# Patient Record
Sex: FEMALE | Race: OTHER | HISPANIC OR LATINO | Employment: UNEMPLOYED | URBAN - METROPOLITAN AREA
[De-identification: names, ages, dates, MRNs, and addresses within clinical notes are randomized per-mention and may not be internally consistent; named-entity substitution may affect disease eponyms.]

---

## 2022-05-25 ENCOUNTER — APPOINTMENT (EMERGENCY)
Dept: CT IMAGING | Facility: HOSPITAL | Age: 72
DRG: 372 | End: 2022-05-25
Payer: COMMERCIAL

## 2022-05-25 ENCOUNTER — HOSPITAL ENCOUNTER (INPATIENT)
Facility: HOSPITAL | Age: 72
LOS: 2 days | Discharge: HOME | DRG: 372 | End: 2022-05-28
Attending: EMERGENCY MEDICINE | Admitting: INTERNAL MEDICINE
Payer: COMMERCIAL

## 2022-05-25 DIAGNOSIS — K52.9 COLITIS: Primary | ICD-10-CM

## 2022-05-25 PROBLEM — F32.A DEPRESSION: Status: ACTIVE | Noted: 2022-05-25

## 2022-05-25 PROBLEM — E03.9 HYPOTHYROIDISM: Status: ACTIVE | Noted: 2022-05-25

## 2022-05-25 PROBLEM — D69.9 BLEEDING DISORDER (HCC): Status: ACTIVE | Noted: 2022-05-25

## 2022-05-25 LAB
ANION GAP SERPL CALCULATED.3IONS-SCNC: 7 MMOL/L (ref 4–13)
APTT PPP: 39 SECONDS (ref 23–37)
BASOPHILS # BLD AUTO: 0.02 THOUSANDS/ΜL (ref 0–0.1)
BASOPHILS NFR BLD AUTO: 0 % (ref 0–1)
BUN SERPL-MCNC: 9 MG/DL (ref 5–25)
CALCIUM SERPL-MCNC: 8.9 MG/DL (ref 8.3–10.1)
CHLORIDE SERPL-SCNC: 98 MMOL/L (ref 100–108)
CO2 SERPL-SCNC: 27 MMOL/L (ref 21–32)
CREAT SERPL-MCNC: 0.63 MG/DL (ref 0.6–1.3)
CRP SERPL QL: 89.2 MG/L
EOSINOPHIL # BLD AUTO: 0.22 THOUSAND/ΜL (ref 0–0.61)
EOSINOPHIL NFR BLD AUTO: 2 % (ref 0–6)
ERYTHROCYTE [DISTWIDTH] IN BLOOD BY AUTOMATED COUNT: 13.6 % (ref 11.6–15.1)
GFR SERPL CREATININE-BSD FRML MDRD: 89 ML/MIN/1.73SQ M
GLUCOSE SERPL-MCNC: 100 MG/DL (ref 65–140)
HCT VFR BLD AUTO: 40.3 % (ref 34.8–46.1)
HGB BLD-MCNC: 13.3 G/DL (ref 11.5–15.4)
IMM GRANULOCYTES # BLD AUTO: 0.03 THOUSAND/UL (ref 0–0.2)
IMM GRANULOCYTES NFR BLD AUTO: 0 % (ref 0–2)
INR PPP: 1.03 (ref 0.84–1.19)
LYMPHOCYTES # BLD AUTO: 1.46 THOUSANDS/ΜL (ref 0.6–4.47)
LYMPHOCYTES NFR BLD AUTO: 15 % (ref 14–44)
MCH RBC QN AUTO: 31.1 PG (ref 26.8–34.3)
MCHC RBC AUTO-ENTMCNC: 33 G/DL (ref 31.4–37.4)
MCV RBC AUTO: 94 FL (ref 82–98)
MONOCYTES # BLD AUTO: 1.31 THOUSAND/ΜL (ref 0.17–1.22)
MONOCYTES NFR BLD AUTO: 14 % (ref 4–12)
NEUTROPHILS # BLD AUTO: 6.57 THOUSANDS/ΜL (ref 1.85–7.62)
NEUTS SEG NFR BLD AUTO: 69 % (ref 43–75)
NRBC BLD AUTO-RTO: 0 /100 WBCS
PLATELET # BLD AUTO: 281 THOUSANDS/UL (ref 149–390)
PMV BLD AUTO: 8.8 FL (ref 8.9–12.7)
POTASSIUM SERPL-SCNC: 4.8 MMOL/L (ref 3.5–5.3)
PROTHROMBIN TIME: 13.3 SECONDS (ref 11.6–14.5)
RBC # BLD AUTO: 4.27 MILLION/UL (ref 3.81–5.12)
SODIUM SERPL-SCNC: 132 MMOL/L (ref 136–145)
WBC # BLD AUTO: 9.61 THOUSAND/UL (ref 4.31–10.16)

## 2022-05-25 PROCEDURE — 87493 C DIFF AMPLIFIED PROBE: CPT | Performed by: EMERGENCY MEDICINE

## 2022-05-25 PROCEDURE — G1004 CDSM NDSC: HCPCS

## 2022-05-25 PROCEDURE — 99220 PR INITIAL OBSERVATION CARE/DAY 70 MINUTES: CPT | Performed by: INTERNAL MEDICINE

## 2022-05-25 PROCEDURE — 85025 COMPLETE CBC W/AUTO DIFF WBC: CPT | Performed by: EMERGENCY MEDICINE

## 2022-05-25 PROCEDURE — 99244 OFF/OP CNSLTJ NEW/EST MOD 40: CPT | Performed by: PHYSICIAN ASSISTANT

## 2022-05-25 PROCEDURE — 85730 THROMBOPLASTIN TIME PARTIAL: CPT | Performed by: INTERNAL MEDICINE

## 2022-05-25 PROCEDURE — 99285 EMERGENCY DEPT VISIT HI MDM: CPT | Performed by: EMERGENCY MEDICINE

## 2022-05-25 PROCEDURE — 36415 COLL VENOUS BLD VENIPUNCTURE: CPT | Performed by: EMERGENCY MEDICINE

## 2022-05-25 PROCEDURE — 80048 BASIC METABOLIC PNL TOTAL CA: CPT | Performed by: EMERGENCY MEDICINE

## 2022-05-25 PROCEDURE — 86140 C-REACTIVE PROTEIN: CPT | Performed by: INTERNAL MEDICINE

## 2022-05-25 PROCEDURE — 87505 NFCT AGENT DETECTION GI: CPT | Performed by: EMERGENCY MEDICINE

## 2022-05-25 PROCEDURE — 85610 PROTHROMBIN TIME: CPT | Performed by: INTERNAL MEDICINE

## 2022-05-25 PROCEDURE — 74176 CT ABD & PELVIS W/O CONTRAST: CPT

## 2022-05-25 PROCEDURE — 99285 EMERGENCY DEPT VISIT HI MDM: CPT

## 2022-05-25 RX ORDER — ESCITALOPRAM OXALATE 10 MG/1
10 TABLET ORAL DAILY
Status: DISCONTINUED | OUTPATIENT
Start: 2022-05-25 | End: 2022-05-28 | Stop reason: HOSPADM

## 2022-05-25 RX ORDER — ENOXAPARIN SODIUM 100 MG/ML
40 INJECTION SUBCUTANEOUS DAILY
Status: DISCONTINUED | OUTPATIENT
Start: 2022-05-26 | End: 2022-05-26

## 2022-05-25 RX ORDER — LOPERAMIDE HYDROCHLORIDE 2 MG/1
2 CAPSULE ORAL 4 TIMES DAILY PRN
Status: DISCONTINUED | OUTPATIENT
Start: 2022-05-25 | End: 2022-05-28 | Stop reason: HOSPADM

## 2022-05-25 RX ORDER — LEVOTHYROXINE SODIUM 0.05 MG/1
50 TABLET ORAL DAILY
COMMUNITY

## 2022-05-25 RX ORDER — ONDANSETRON 2 MG/ML
4 INJECTION INTRAMUSCULAR; INTRAVENOUS EVERY 6 HOURS PRN
Status: DISCONTINUED | OUTPATIENT
Start: 2022-05-25 | End: 2022-05-28 | Stop reason: HOSPADM

## 2022-05-25 RX ORDER — ACETAMINOPHEN 325 MG/1
650 TABLET ORAL EVERY 6 HOURS PRN
Status: DISCONTINUED | OUTPATIENT
Start: 2022-05-25 | End: 2022-05-28 | Stop reason: HOSPADM

## 2022-05-25 RX ORDER — SODIUM CHLORIDE 9 MG/ML
100 INJECTION, SOLUTION INTRAVENOUS CONTINUOUS
Status: DISPENSED | OUTPATIENT
Start: 2022-05-25 | End: 2022-05-26

## 2022-05-25 RX ORDER — LEVOTHYROXINE SODIUM 0.05 MG/1
50 TABLET ORAL
Status: DISCONTINUED | OUTPATIENT
Start: 2022-05-26 | End: 2022-05-28 | Stop reason: HOSPADM

## 2022-05-25 RX ORDER — MAGNESIUM HYDROXIDE/ALUMINUM HYDROXICE/SIMETHICONE 120; 1200; 1200 MG/30ML; MG/30ML; MG/30ML
30 SUSPENSION ORAL EVERY 6 HOURS PRN
Status: DISCONTINUED | OUTPATIENT
Start: 2022-05-25 | End: 2022-05-28 | Stop reason: HOSPADM

## 2022-05-25 RX ORDER — SACCHAROMYCES BOULARDII 250 MG
250 CAPSULE ORAL 2 TIMES DAILY
Status: DISCONTINUED | OUTPATIENT
Start: 2022-05-25 | End: 2022-05-28 | Stop reason: HOSPADM

## 2022-05-25 RX ORDER — ESCITALOPRAM OXALATE 10 MG/1
10 TABLET ORAL DAILY
COMMUNITY

## 2022-05-25 RX ADMIN — SODIUM CHLORIDE 100 ML/HR: 0.9 INJECTION, SOLUTION INTRAVENOUS at 17:34

## 2022-05-25 RX ADMIN — Medication 125 MG: at 17:35

## 2022-05-25 RX ADMIN — Medication 250 MG: at 17:35

## 2022-05-25 NOTE — H&P
[address] [facility]  H&P- [facility] 1950, 66 y o  female MRN: [id number]  Unit/Bed#: [facility] Encounter: [id number]  Primary Care Provider: No primary care provider on file  Date and time admitted to hospital: 5/25/2022 11:51 AM    Assessment and Plan  Bleeding disorder [facility])  Assessment & Plan  Ill-defined bleeding disorder, patient's daughter reports history bleeding after thyroidectomy in the past  Monitor with DVT prophylaxis  Reports not von Willebrand's    Hypothyroidism  Assessment & Plan  Continue Synthroid    Depression  Assessment & Plan  Continue Lexapro    * Colitis  Assessment & Plan  Patient with 2 week symptoms of diarrhea  Initially felt to be in the setting of recent seafood consumption although though family members with any similar intake  Need course of clindamycin and Flagyl  Remains high risk for C diff infection  Start empiric oral vancomycin  Await fecal studies  GI consultation  Last colonoscopy 10 years in [facility]  Check inflammatory markers  No known history of inflammatory bowel disease  Monitor stool output        Code Status:  Full code    VTE Prophylaxis: Enoxaparin (Lovenox)  / sequential compression device     POLST: There is no POLST form on file for this patient (pre-hospital)  Discussion with family:  Daughter at bedside provided translation services    Anticipated Length of Stay:  Patient will be admitted on an Observation basis with an anticipated length of stay of  less than 2 midnights  Justification for Hospital Stay: Colitis     Total Time for Visit, including Counseling / Coordination of Care: 45 minutes  Greater than 50% of this total time spent on direct patient counseling and coordination of care      Chief Complaint:     Chief Complaint   Patient presents with    Diarrhea     Pt c/o diarrhea for a month has been to urgent care twice has been on medication but only helped a little bit , this morning there was some blood and mucus in her liquid stool, stool is yellow and green  C/o of lower abdominal discomfort with nausea,   ate calamari a month ago and since then, hx diverticulitis       History of Present Illness:    Jonah Bryson is a 66 y o  female who presents with nausea vomiting and diarrhea, she reports that she is visiting from [facility]  She rides in April  Approximately 2 weeks ago they did have calcium RA, she was after that she did not feel well  Family members ate the same thing with no similar symptoms  The patient denies any abdominal pain or history of previous inflammatory bowel disease  Today she did have a few episodes of blood in her bowel movements of presented to the emergency room for acute evaluation  She had recently completed a course of clindamycin and Flagyl at urgent care  She has no other recent travel or trip history, and plan is to return back to home on August   She has no local PCP follow-up    Review of Systems:    A complete and comprehensive 14 point organ system review was performed and all other systems are negative other than stated above in the HPI    Past Medical and Surgical History:     Past Medical History:   Diagnosis Date    Disease of thyroid gland     Diverticulitis     Diverticulitis        Past Surgical History:   Procedure Laterality Date    THYROIDECTOMY      THYROIDECTOMY, PARTIAL         Meds/Allergies:    Prior to Admission medications    Medication Sig Start Date End Date Taking? Authorizing Provider   escitalopram (LEXAPRO) 10 mg tablet Take 10 mg by mouth daily   Yes Historical Provider, MD   levothyroxine 50 mcg tablet Take 50 mcg by mouth daily   Yes Historical Provider, MD     I have reviewed home medications with patient personally  Allergies:    Allergies   Allergen Reactions    Secnidazole Facial Swelling    Tinidazole Swelling    Penicillins Rash       Social History:     Marital Status: Single   Occupation:  Unknown    Substance Use History:   Social History Substance and Sexual Activity   Alcohol Use Never     Social History     Tobacco Use   Smoking Status Never Smoker   Smokeless Tobacco Never Used     Social History     Substance and Sexual Activity   Drug Use Never       Family History:    History reviewed  No pertinent family history  Physical Exam:     Vitals:   Blood Pressure: 119/65 (05/25/22 1444)  Pulse: 79 (05/25/22 1444)  Temperature: 98 5 °F (36 9 °C) (05/25/22 1134)  Temp Source: Oral (05/25/22 1134)  Respirations: 18 (05/25/22 1444)  Height: 5' 1" (154 9 cm) (05/25/22 1134)  Weight - Scale: 47 7 kg (105 lb 2 6 oz) (05/25/22 1134)  SpO2: 98 % (05/25/22 1444)      General: well appearing, no acute distress  HEENT: atraumatic, PERRLA, moist mucosa, normal pharynx, normal tonsils and adenoids, normal tongue, no fluid in sinuses  Neck: Trachea midline, no carotid bruit, no masses  Respiratory: normal chest wall expansion, CTA B, no r/r/w, no rubs  Cardiovascular: RRR, no m/r/g, Normal S1 and S2  Abdomen: Soft, non-tender, non-distended, normal bowel sounds in all quadrants, no hepatosplenomegaly, no tympany  Rectal: deferred  Musculoskeletal: normal ROM in upper and lower extremities  Integumentary: warm, dry, and pink, with no rash, purpura, or petechia  Heme/Lymph: no lymphadenopathy, no bruises  Neurological: Cranial Nerves II-XII grossly intact  Psychiatric: cooperative with normal mood, affect, and cognition    Additional Data:     Lab Results: I have personally reviewed pertinent reports        Results from last 7 days   Lab Units 05/25/22  1212   WBC Thousand/uL 9 61   HEMOGLOBIN g/dL 13 3   HEMATOCRIT % 40 3   PLATELETS Thousands/uL 281   NEUTROS PCT % 69   LYMPHS PCT % 15   MONOS PCT % 14*   EOS PCT % 2     Results from last 7 days   Lab Units 05/25/22  1212   SODIUM mmol/L 132*   POTASSIUM mmol/L 4 8   CHLORIDE mmol/L 98*   CO2 mmol/L 27   BUN mg/dL 9   CREATININE mg/dL 0 63   ANION GAP mmol/L 7   CALCIUM mg/dL 8 9   GLUCOSE RANDOM mg/dL 100 Imaging: I have personally reviewed pertinent reports  CT abdomen pelvis wo contrast   Final Result by Moiz Xiao MD (05/25 8137)   Diffuse colonic and rectal wall thickening consistent with uncomplicated colitis/proctitis  Workstation performed: [id number]             EKG, Pathology, and Other Studies Reviewed on Admission:   · Reviewed CT abdomen and pelvis without contrast which shows uncomplicated colitis/proctitis    Allscripts / Epic Records Reviewed: Yes     ** Please Note: This note was completed in part utilizing M-Modal Fluency Direct Software  Grammatical errors, random word insertions, spelling mistakes, and incomplete sentences may be an occasional consequence of this system secondary to software limitations, ambient noise, and hardware issues  If you have any questions or concerns about the content, text, or information contained within the body of this dictation, please contact the provider for clarification  **

## 2022-05-25 NOTE — PLAN OF CARE
Problem: GASTROINTESTINAL - ADULT  Goal: Minimal or absence of nausea and/or vomiting  Description: INTERVENTIONS:  - Administer IV fluids if ordered to ensure adequate hydration  - Maintain NPO status until nausea and vomiting are resolved  - Nasogastric tube if ordered  - Administer ordered antiemetic medications as needed  - Provide nonpharmacologic comfort measures as appropriate  - Advance diet as tolerated, if ordered  - Consider nutrition services referral to assist patient with adequate nutrition and appropriate food choices  Outcome: Progressing  Goal: Maintains or returns to baseline bowel function  Description: INTERVENTIONS:  - Assess bowel function  - Encourage oral fluids to ensure adequate hydration  - Administer IV fluids if ordered to ensure adequate hydration  - Administer ordered medications as needed  - Encourage mobilization and activity  - Consider nutritional services referral to assist patient with adequate nutrition and appropriate food choices  Outcome: Progressing  Goal: Maintains adequate nutritional intake  Description: INTERVENTIONS:  - Monitor percentage of each meal consumed  - Identify factors contributing to decreased intake, treat as appropriate  - Assist with meals as needed  - Monitor I&O, weight, and lab values if indicated  - Obtain nutrition services referral as needed  Outcome: Progressing     Problem: PAIN - ADULT  Goal: Verbalizes/displays adequate comfort level or baseline comfort level  Description: Interventions:  - Encourage patient to monitor pain and request assistance  - Assess pain using appropriate pain scale  - Administer analgesics based on type and severity of pain and evaluate response  - Implement non-pharmacological measures as appropriate and evaluate response  - Consider cultural and social influences on pain and pain management  - Notify physician/advanced practitioner if interventions unsuccessful or patient reports new pain  Outcome: Progressing Problem: INFECTION - ADULT  Goal: Absence or prevention of progression during hospitalization  Description: INTERVENTIONS:  - Assess and monitor for signs and symptoms of infection  - Monitor lab/diagnostic results  - Monitor all insertion sites, i e  indwelling lines, tubes, and drains  - Monitor endotracheal if appropriate and nasal secretions for changes in amount and color  - [place] appropriate cooling/warming therapies per order  - Administer medications as ordered  - Instruct and encourage patient and family to use good hand hygiene technique  - Identify and instruct in appropriate isolation precautions for identified infection/condition  Outcome: Progressing     Problem: SAFETY ADULT  Goal: Patient will remain free of falls  Description: INTERVENTIONS:  - Educate patient/family on patient safety including physical limitations  - Instruct patient to call for assistance with activity   - Consult OT/PT to assist with strengthening/mobility   - Keep Call bell within reach  - Keep bed low and locked with side rails adjusted as appropriate  - Keep care items and personal belongings within reach  - Initiate and maintain comfort rounds  - Make Fall Risk Sign visible to staff  - Offer Toileting every 2 Hours, in advance of need  - Apply yellow socks and bracelet for high fall risk patients  - Consider moving patient to room near nurses station  Outcome: Progressing  Goal: Maintain or return to baseline ADL function  Description: INTERVENTIONS:  -  Assess patient's ability to carry out ADLs; assess patient's baseline for ADL function and identify physical deficits which impact ability to perform ADLs (bathing, care of mouth/teeth, toileting, grooming, dressing, etc )  - Assess/evaluate cause of self-care deficits   - Assess range of motion  - Assess patient's mobility; develop plan if impaired  - Assess patient's need for assistive devices and provide as appropriate  - Encourage maximum independence but intervene and supervise when necessary  - Involve family in performance of ADLs  - Assess for home care needs following discharge   - Consider OT consult to assist with ADL evaluation and planning for discharge  - Provide patient education as appropriate  Outcome: Progressing  Goal: Maintains/Returns to pre admission functional level  Description: INTERVENTIONS:  - Perform BMAT or MOVE assessment daily    - Set and communicate daily mobility goal to care team and patient/family/caregiver  - Collaborate with rehabilitation services on mobility goals if consulted  - Perform Range of Motion 3 times a day  - Reposition patient every 2 hours  - Dangle patient 3 times a day  - Stand patient 3 times a day  - Ambulate patient 3 times a day  - Out of bed to chair 3 times a day   - Out of bed for meals 3 times a day  - Out of bed for toileting  - Record patient progress and toleration of activity level   Outcome: Progressing     Problem: DISCHARGE PLANNING  Goal: Discharge to home or other facility with appropriate resources  Description: INTERVENTIONS:  - Identify barriers to discharge w/patient and caregiver  - Arrange for needed discharge resources and transportation as appropriate  - Identify discharge learning needs (meds, wound care, etc )  - Arrange for interpretive services to assist at discharge as needed  - Refer to Case Management Department for coordinating discharge planning if the patient needs post-hospital services based on physician/advanced practitioner order or complex needs related to functional status, cognitive ability, or social support system  Outcome: Progressing     Problem: Knowledge Deficit  Goal: Patient/family/caregiver demonstrates understanding of disease process, treatment plan, medications, and discharge instructions  Description: Complete learning assessment and assess knowledge base    Interventions:  - Provide teaching at level of understanding  - Provide teaching via preferred learning methods  Outcome: Progressing

## 2022-05-25 NOTE — ED NOTES
Patient attempted to provide sample for c diff however contaminated with urine  RN relayed to provider  Patient's daughter also asked regarding eating/drinking and provider indicated that was acceptable       Bita Love, RN  05/25/22 4556

## 2022-05-25 NOTE — CONSULTS
Consultation - [facility]  Jose Bradshaw 66 y o  female MRN: [id number]  Unit/Bed#: E5 -01 Encounter: [id number]        Inpatient consult to gastroenterology  Consult performed by: Angela Bellamy PA-C  Consult ordered by: Amalia Adams DO        ASSESSMENT and PLAN:      69-year-old [place] female with history of non-specific bleeding disorder, depression, hypothyroidism admitted with diarrhea for 1-2 months  1) Chronic diarrhea  2) Abdominal cramping  3) Abnormal CT scan, colon    She reports diarrhea for 1-2 months sometimes mixed with blood and mucous, abdominal cramping, gas  Her symptoms began after eating seafood, and she was treated with course of metronidazole and ciprofloxacin without improvement  Blood work mostly unremarkable except for mild hyponatremia  Hemoglobin, WBC count, creatinine all normal  She is afebrile and hemodynamically stable  CT abdomen pelvis wo contrast shows diffuse colonic and rectal wall thickening consistent with uncomplicated colitis/proctitis  Differential includes infectious colitis vs inflammatory bowel disease vs less likely ischemic colitis or malignancy     -await results of stool enteric panel and C diff toxin  -await CRP and fecal calprotectin   -if infectious stool studies negative, we will plan for colonoscopy on Friday  -she can have regular diet tonight then clear liquids tomorrow in anticipation of colonoscopy  -continues supportive care with IVF  -okay to continue empiric oral vancomycin for now    Patient was seen and examined by Dr Ana Fleming  All alejandra medical decisions were made by Dr Ana Fleming  Thank you for allowing us to participate in the care of this present patient  We will follow-up with you closely  Reason for Consult / Principal Problem: diarrhea, colitis    HPI: 69-year-old [place] female with history of non-specific bleeding disorder, depression, hypothyroidism admitted with diarrhea x 1 5 months   Mongolian  [id number] was used to communicate  She normally lives in [facility] but came to visit her daugher in the [address] 2 months ago  She reports symptoms began 1 5 months ago after she ate some squid  She sought medical care and was treated with metronidazole and ciprofloxacin  Her symptoms persisted  She reports upwards of 5-6 liquid BMs daily mixed with mucous and blood at times  She is very gassy  She also reports abdominal cramping  She thinks she may have lost some weight  She denies nausea, vomiting, fever, chills  She reports her mother was diagnosed with colon inflammation  She denies family history of colon cancer  She had a colonoccopy about 10 years ago in [facility]  REVIEW OF SYSTEMS:    CONSTITUTIONAL: Denies any fever, chills  Good appetite, and no recent weight loss  HEENT: No earache or tinnitus  Denies hearing loss or visual disturbances  CARDIOVASCULAR: No chest pain or palpitations  RESPIRATORY: Denies any cough, hemoptysis, shortness of breath or dyspnea on exertion  GASTROINTESTINAL: As noted in the History of Present Illness  GENITOURINARY: No problems with urination  Denies any hematuria or dysuria  NEUROLOGIC: No dizziness or vertigo, denies headaches  MUSCULOSKELETAL: Denies any muscle or joint pain  SKIN: Denies skin rashes or itching  ENDOCRINE: Denies excessive thirst  Denies intolerance to heat or cold  PSYCHOSOCIAL: Denies depression or anxiety  Denies any recent memory loss         Historical Information   Past Medical History:   Diagnosis Date    Disease of thyroid gland     Diverticulitis     Diverticulitis      Past Surgical History:   Procedure Laterality Date    THYROIDECTOMY      THYROIDECTOMY, PARTIAL       Social History   Social History     Substance and Sexual Activity   Alcohol Use Never     Social History     Substance and Sexual Activity   Drug Use Never     Social History     Tobacco Use   Smoking Status Never Smoker   Smokeless Tobacco Never Used History reviewed  No pertinent family history  Meds/Allergies     Medications Prior to Admission   Medication    escitalopram (LEXAPRO) 10 mg tablet    levothyroxine 50 mcg tablet     Current Facility-Administered Medications   Medication Dose Route Frequency    acetaminophen (TYLENOL) tablet 650 mg  650 mg Oral Q6H PRN    aluminum-magnesium hydroxide-simethicone (MYLANTA) oral suspension 30 mL  30 mL Oral Q6H PRN    [START ON 5/26/2022] enoxaparin (LOVENOX) subcutaneous injection 40 mg  40 mg Subcutaneous Daily    escitalopram (LEXAPRO) tablet 10 mg  10 mg Oral Daily    [START ON 5/26/2022] levothyroxine tablet 50 mcg  50 mcg Oral Early Morning    ondansetron (ZOFRAN) injection 4 mg  4 mg Intravenous Q6H PRN    sodium chloride 0 9 % infusion  100 mL/hr Intravenous Continuous    vancomycin (VANCOCIN) oral solution 125 mg  125 mg Oral Q6H DONTAE       Allergies   Allergen Reactions    Secnidazole Facial Swelling    Tinidazole Swelling    Penicillins Rash           Objective     Blood pressure 100/66, pulse 79, temperature 98 3 °F (36 8 °C), resp  rate 18, height 5' 1" (1 549 m), weight 47 7 kg (105 lb 2 6 oz), SpO2 98 %      No intake or output data in the 24 hours ending 05/25/22 1620      PHYSICAL EXAM:      General Appearance:   Alert, cooperative, no distress, appears stated age    HEENT:   Normocephalic, atraumatic, anicteric      Neck:  Supple, symmetrical, trachea midline, no adenopathy   Lungs:   Clear to auscultation bilaterally   Heart[de-identified]   S1 and S2 normal; regular rate and rhythm   Abdomen:   Soft, non-tender, non-distended; normal bowel sounds   Genitalia:   Deferred    Rectal:   Deferred    Extremities:  No cyanosis, clubbing or edema    Pulses:  2+ and symmetric all extremities    Skin:  Skin color, texture, turgor normal, no rashes or lesions    Lymph nodes:  No palpable cervical lymphadenopathy        Lab Results:   Results from last 7 days   Lab Units 05/25/22  1212   WBC Thousand/uL 9  61   HEMOGLOBIN g/dL 13 3   HEMATOCRIT % 40 3   PLATELETS Thousands/uL 281   NEUTROS PCT % 69   LYMPHS PCT % 15   MONOS PCT % 14*   EOS PCT % 2     Results from last 7 days   Lab Units 05/25/22  1212   POTASSIUM mmol/L 4 8   CHLORIDE mmol/L 98*   CO2 mmol/L 27   BUN mg/dL 9   CREATININE mg/dL 0 63   CALCIUM mg/dL 8 9     Results from last 7 days   Lab Units 05/25/22  1215   INR  1 03           Imaging Studies: I have personally reviewed pertinent imaging studies  CT abdomen pelvis wo contrast    Result Date: 5/25/2022  Impression: Diffuse colonic and rectal wall thickening consistent with uncomplicated colitis/proctitis   Workstation performed: [id number]

## 2022-05-25 NOTE — ASSESSMENT & PLAN NOTE
Patient with 2 week symptoms of diarrhea  Initially felt to be in the setting of recent seafood consumption although though family members with any similar intake  Need course of clindamycin and Flagyl    Remains high risk for C diff infection  Start empiric oral vancomycin  Await fecal studies  GI consultation  Last colonoscopy 10 years in [facility]  Check inflammatory markers  No known history of inflammatory bowel disease  Monitor stool output

## 2022-05-25 NOTE — ED NOTES
Patient has fecal specimen that was left in room  RN labeled and tech will be calling the floor to notify prior to sending through tube station       Shaheed Pedroza RN  05/25/22 4330

## 2022-05-25 NOTE — ED PROVIDER NOTES
Emergency Department Note- Mayela Carter 66 y o  female MRN: [id number]    Unit/Bed#: ED 29 Encounter: [id number]        History of Present Illness     Patient is a 76-year-old female, predominantly Bulgarian speaking, accompanied by her daughter, indicates she would prefer to have the daughter as a  rather than using a  service  Patient normally lives in [place], came to the [facility] 2 months ago to stay with her daughter temporarily  One month ago the patient began having multiple episodes of nonbloody, non mucousy but watery diarrhea  Had some lower abdominal cramping with the diarrhea, prior to coming to [facility], there was no unusual travel history, no camping or backpacking or drinking from streams or antibiotics  There has been no subsequent travel history, camping, backpacking, drinking from streams, or antibiotics  This started today after the patient's daughter's  prepared calamari, but everyone that ate  that and no one else develops symptoms  She was seen in urgent care on May 7th for these symptoms, prescribe 7 days of ciprofloxacin and metronidazole, had slight decrease in the frequency of diarrhea but did not go completely away  Patient was seen 2 days ago at urgent care, had outpatient laboratory and stool samples ordered but those have not been performed yet  Patient presents today because yesterday she had significant increase in the frequency of the diarrhea, 7 episodes during the daytime, 6 overnight  During the overnight episodes she noticed some bright red blood in her stool  She has some lower abdominal cramping previously, with the diarrhea  She has no pain right now  She has no fever, no chills, no cough, shortness of breath, no myalgias or arthralgias, no nausea or vomiting      REVIEW OF SYSTEMS     Constitutional:  No recent weight  gains or losses   Eyes:  No visual changes   ENT:  No tinnitus or hearing changes   Cardiac: No chest pain or palpitations   Respiratory:  No cough or shortness of breath   Abdominal:  As per HPI   Urinary: No dysuria or hematuria   Hematologic: No easy bruising or bleeding   Skin: No rash   Musculoskeletal: No aches or pains   Neurologic: No weakness or sensory changes   Psychiatric: No mood changes      Historical Information   Past Medical History:   Diagnosis Date    Disease of thyroid gland     Diverticulitis     Diverticulitis        Past surgical history:  Partial thyroidectomy, hysterectomy  Social History   Social History     Substance and Sexual Activity   Alcohol Use Never     Social History     Substance and Sexual Activity   Drug Use Never     Social History     Tobacco Use   Smoking Status Never Smoker   Smokeless Tobacco Never Used     Family History: History reviewed  No pertinent family history  MEDICATIONS:  Levothyroxine, citalopram    ALLERGIES:  Allergies   Allergen Reactions    Secnidazole Facial Swelling    Tinidazole Swelling    Penicillins Rash       Vitals:    05/25/22 1134   BP: 111/74   TempSrc: Oral   Pulse: 84   Resp: 18   Patient Position - Orthostatic VS: Sitting   Temp: 98 5 °F (36 9 °C)       PHYSICAL EXAM    General:  Patient is well-appearing  Head:  Atraumatic  Eyes:  Conjunctiva pink  ENT:  Mucous membranes are moist  Neck:  Supple  Cardiac:  S1-S2, without murmurs  Lungs:  Clear to auscultation bilaterally  Abdomen:  Soft, nontender, normal bowel sounds, no CVA tenderness, no tympany, no rigidity, no guarding  Extremities:  Normal range of motion  Neurologic:  Awake, fluent speech, normal comprehension  AAOx3     Skin:  Pink warm and dry  Psychiatric:  Alert, pleasant, cooperative            Labs Reviewed   CBC AND DIFFERENTIAL - Abnormal       Result Value Ref Range Status    WBC 9 61  4 31 - 10 16 Thousand/uL Final    RBC 4 27  3 81 - 5 12 Million/uL Final    Hemoglobin 13 3  11 5 - 15 4 g/dL Final    Hematocrit 40 3  34 8 - 46 1 % Final    MCV 94  82 - 98 fL Final MCH 31 1  26 8 - 34 3 pg Final    MCHC 33 0  31 4 - 37 4 g/dL Final    RDW 13 6  11 6 - 15 1 % Final    MPV 8 8 (*) 8 9 - 12 7 fL Final    Platelets 919  193 - 390 Thousands/uL Final    nRBC 0  /100 WBCs Final    Neutrophils Relative 69  43 - 75 % Final    Immat GRANS % 0  0 - 2 % Final    Lymphocytes Relative 15  14 - 44 % Final    Monocytes Relative 14 (*) 4 - 12 % Final    Eosinophils Relative 2  0 - 6 % Final    Basophils Relative 0  0 - 1 % Final    Neutrophils Absolute 6 57  1 85 - 7 62 Thousands/µL Final    Immature Grans Absolute 0 03  0 00 - 0 20 Thousand/uL Final    Lymphocytes Absolute 1 46  0 60 - 4 47 Thousands/µL Final    Monocytes Absolute 1 31 (*) 0 17 - 1 22 Thousand/µL Final    Eosinophils Absolute 0 22  0 00 - 0 61 Thousand/µL Final    Basophils Absolute 0 02  0 00 - 0 10 Thousands/µL Final   BASIC METABOLIC PANEL - Abnormal    Sodium 132 (*) 136 - 145 mmol/L Final    Potassium 4 8  3 5 - 5 3 mmol/L Final    Chloride 98 (*) 100 - 108 mmol/L Final    CO2 27  21 - 32 mmol/L Final    ANION GAP 7  4 - 13 mmol/L Final    BUN 9  5 - 25 mg/dL Final    Creatinine 0 63  0 60 - 1 30 mg/dL Final    Comment: Standardized to IDMS reference method    Glucose 100  65 - 140 mg/dL Final    Comment: If the patient is fasting, the ADA then defines impaired fasting glucose as > 100 mg/dL and diabetes as > or equal to 123 mg/dL  Specimen collection should occur prior to Sulfasalazine administration due to the potential for falsely depressed results  Specimen collection should occur prior to Sulfapyridine administration due to the potential for falsely elevated results      Calcium 8 9  8 3 - 10 1 mg/dL Final    eGFR 89  ml/min/1 73sq m Final    Narrative:     [place] guidelines for Chronic Kidney Disease (CKD):     Stage 1 with normal or high GFR (GFR > 90 mL/min/1 73 square meters)    Stage 2 Mild CKD (GFR = 60-89 mL/min/1 73 square meters)    Stage 3A Moderate CKD (GFR = 45-59 mL/min/1 73 square meters)    Stage 3B Moderate CKD (GFR = 30-44 mL/min/1 73 square meters)    Stage 4 Severe CKD (GFR = 15-29 mL/min/1 73 square meters)    Stage 5 End Stage CKD (GFR <15 mL/min/1 73 square meters)  Note: GFR calculation is accurate only with a steady state creatinine   STOOL ENTERIC BACTERIAL PANEL BY PCR   CLOSTRIDIUM DIFFICILE TOXIN BY PCR WITH EIA       Medications - No data to display    CT abdomen pelvis wo contrast   Final Result   Diffuse colonic and rectal wall thickening consistent with uncomplicated colitis/proctitis  Workstation performed: [id number]             ED Course as of 05/25/22 1410   Wed May 25, 2022   1345 On reassessment, no change from the above findings       Assessment/Plan     ED Medical Decision Making:    Due to a nationwide contrast shortage, IV contrast was not available to be used in this patient's CT per [facility] protocol  Patient overall well appearing, no signs of peritonitis on exam, given increased frequency of diarrhea as well as now developing bloody diarrhea, will admit for further workup and management  Infectious pathology less likely given patient's history, as well as previous treatment with antibiotics  Time reflects when diagnosis was documented in both MDM as applicable and the Disposition within this note     Time User Action Codes Description Comment    5/25/2022  1:48 PM [facility] Add [K52 9] Colitis       ED Disposition     ED Disposition   Admit    Condition   Stable    Date/Time   Wed May 25, 2022  2:04 PM    Comment   Case was discussed with Dr Benny Godoy and the patient's admission status was agreed to be Admission Status: observation status to the service of Dr Benny Godoy              Follow-up Information    None         New Prescriptions    No medications on file            Saida Umana DO  05/25/22 1410

## 2022-05-25 NOTE — ASSESSMENT & PLAN NOTE
Ill-defined bleeding disorder, patient's daughter reports history bleeding after thyroidectomy in the past  Monitor with DVT prophylaxis  Reports not [facility]

## 2022-05-25 NOTE — ED NOTES
Explained to pt that a stool sample is needed and how to use the hat        Aime Flores  05/25/22 4701

## 2022-05-26 ENCOUNTER — ANESTHESIA EVENT (INPATIENT)
Dept: GASTROENTEROLOGY | Facility: HOSPITAL | Age: 72
DRG: 372 | End: 2022-05-26
Payer: COMMERCIAL

## 2022-05-26 LAB
ANION GAP SERPL CALCULATED.3IONS-SCNC: 6 MMOL/L (ref 4–13)
BASOPHILS # BLD AUTO: 0.02 THOUSANDS/ΜL (ref 0–0.1)
BASOPHILS NFR BLD AUTO: 0 % (ref 0–1)
BUN SERPL-MCNC: 4 MG/DL (ref 5–25)
C DIFF TOX A+B STL QL IA: NEGATIVE
C DIFF TOX B TCDB STL QL NAA+PROBE: POSITIVE
CALCIUM SERPL-MCNC: 8.8 MG/DL (ref 8.3–10.1)
CAMPYLOBACTER DNA SPEC NAA+PROBE: NORMAL
CHLORIDE SERPL-SCNC: 104 MMOL/L (ref 100–108)
CO2 SERPL-SCNC: 25 MMOL/L (ref 21–32)
CREAT SERPL-MCNC: 0.67 MG/DL (ref 0.6–1.3)
EOSINOPHIL # BLD AUTO: 0.24 THOUSAND/ΜL (ref 0–0.61)
EOSINOPHIL NFR BLD AUTO: 4 % (ref 0–6)
ERYTHROCYTE [DISTWIDTH] IN BLOOD BY AUTOMATED COUNT: 13.5 % (ref 11.6–15.1)
GFR SERPL CREATININE-BSD FRML MDRD: 88 ML/MIN/1.73SQ M
GLUCOSE SERPL-MCNC: 99 MG/DL (ref 65–140)
HCT VFR BLD AUTO: 36.3 % (ref 34.8–46.1)
HGB BLD-MCNC: 11.7 G/DL (ref 11.5–15.4)
IMM GRANULOCYTES # BLD AUTO: 0.04 THOUSAND/UL (ref 0–0.2)
IMM GRANULOCYTES NFR BLD AUTO: 1 % (ref 0–2)
LYMPHOCYTES # BLD AUTO: 1.33 THOUSANDS/ΜL (ref 0.6–4.47)
LYMPHOCYTES NFR BLD AUTO: 24 % (ref 14–44)
MCH RBC QN AUTO: 29.8 PG (ref 26.8–34.3)
MCHC RBC AUTO-ENTMCNC: 32.2 G/DL (ref 31.4–37.4)
MCV RBC AUTO: 92 FL (ref 82–98)
MONOCYTES # BLD AUTO: 0.71 THOUSAND/ΜL (ref 0.17–1.22)
MONOCYTES NFR BLD AUTO: 13 % (ref 4–12)
NEUTROPHILS # BLD AUTO: 3.16 THOUSANDS/ΜL (ref 1.85–7.62)
NEUTS SEG NFR BLD AUTO: 58 % (ref 43–75)
NRBC BLD AUTO-RTO: 0 /100 WBCS
PLATELET # BLD AUTO: 278 THOUSANDS/UL (ref 149–390)
PMV BLD AUTO: 9.2 FL (ref 8.9–12.7)
POTASSIUM SERPL-SCNC: 4.7 MMOL/L (ref 3.5–5.3)
RBC # BLD AUTO: 3.93 MILLION/UL (ref 3.81–5.12)
SALMONELLA DNA SPEC QL NAA+PROBE: NORMAL
SHIGA TOXIN STX GENE SPEC NAA+PROBE: NORMAL
SHIGELLA DNA SPEC QL NAA+PROBE: NORMAL
SODIUM SERPL-SCNC: 135 MMOL/L (ref 136–145)
WBC # BLD AUTO: 5.5 THOUSAND/UL (ref 4.31–10.16)

## 2022-05-26 PROCEDURE — 80048 BASIC METABOLIC PNL TOTAL CA: CPT | Performed by: INTERNAL MEDICINE

## 2022-05-26 PROCEDURE — 99232 SBSQ HOSP IP/OBS MODERATE 35: CPT | Performed by: INTERNAL MEDICINE

## 2022-05-26 PROCEDURE — 85025 COMPLETE CBC W/AUTO DIFF WBC: CPT | Performed by: INTERNAL MEDICINE

## 2022-05-26 PROCEDURE — 99232 SBSQ HOSP IP/OBS MODERATE 35: CPT | Performed by: PHYSICIAN ASSISTANT

## 2022-05-26 PROCEDURE — 83993 ASSAY FOR CALPROTECTIN FECAL: CPT | Performed by: INTERNAL MEDICINE

## 2022-05-26 RX ORDER — POLYETHYLENE GLYCOL 3350 17 G/17G
238 POWDER, FOR SOLUTION ORAL ONCE
Status: COMPLETED | OUTPATIENT
Start: 2022-05-26 | End: 2022-05-26

## 2022-05-26 RX ORDER — SODIUM CHLORIDE 9 MG/ML
100 INJECTION, SOLUTION INTRAVENOUS CONTINUOUS
Status: DISCONTINUED | OUTPATIENT
Start: 2022-05-26 | End: 2022-05-28

## 2022-05-26 RX ADMIN — Medication 125 MG: at 05:08

## 2022-05-26 RX ADMIN — Medication 125 MG: at 17:05

## 2022-05-26 RX ADMIN — POLYETHYLENE GLYCOL 3350 238 G: 17 POWDER, FOR SOLUTION ORAL at 17:06

## 2022-05-26 RX ADMIN — Medication 125 MG: at 00:00

## 2022-05-26 RX ADMIN — ENOXAPARIN SODIUM 40 MG: 40 INJECTION SUBCUTANEOUS at 08:56

## 2022-05-26 RX ADMIN — LEVOTHYROXINE SODIUM 50 MCG: 50 TABLET ORAL at 07:28

## 2022-05-26 RX ADMIN — ESCITALOPRAM OXALATE 10 MG: 10 TABLET ORAL at 08:56

## 2022-05-26 RX ADMIN — Medication 125 MG: at 11:51

## 2022-05-26 RX ADMIN — SODIUM CHLORIDE 100 ML/HR: 0.9 INJECTION, SOLUTION INTRAVENOUS at 03:20

## 2022-05-26 RX ADMIN — Medication 250 MG: at 17:05

## 2022-05-26 RX ADMIN — Medication 250 MG: at 08:56

## 2022-05-26 NOTE — ASSESSMENT & PLAN NOTE
· Ill-defined bleeding disorder, patient's daughter reported history bleeding after thyroidectomy in the past  · Patient with blood in stools per family report   Discontinue Lovenox

## 2022-05-26 NOTE — PROGRESS NOTES
Progress Note - Arlin Lugo 66 y o  female MRN: [id number]    Unit/Bed#: E5 -01 Encounter: [id number]    Subjective:     Patient seen and examined bedside  Her daughter is present and helps provide history  She has been on vancomycin and currently her diarrhea is less frequent although still liquidy with mucus and tinge of blood  Objective:     Vitals: Blood pressure 99/64, pulse 70, temperature (!) 97 1 °F (36 2 °C), resp  rate 18, height 5' 1" (1 549 m), weight 49 kg (108 lb), SpO2 96 %  ,Body mass index is 20 41 kg/m²  No intake or output data in the 24 hours ending 05/26/22 1409    Physical Exam:     General Appearance: Alert, appears stated age and cooperative  Lungs: Clear to auscultation bilaterally, no rales or rhonchi, no labored breathing/accessory muscle use  Heart: Regular rate and rhythm, S1, S2 normal, no murmur, click, rub or gallop  Abdomen: Soft, non-tender, non-distended; bowel sounds normal; no masses or no organomegaly  Extremities: No cyanosis, edema    Invasive Devices  Report    Peripheral Intravenous Line  Duration           Peripheral IV 05/25/22 Right Antecubital 1 day                Lab Results:  Results from last 7 days   Lab Units 05/26/22  0452   WBC Thousand/uL 5 50   HEMOGLOBIN g/dL 11 7   HEMATOCRIT % 36 3   PLATELETS Thousands/uL 278   NEUTROS PCT % 58   LYMPHS PCT % 24   MONOS PCT % 13*   EOS PCT % 4     Results from last 7 days   Lab Units 05/26/22  0452   POTASSIUM mmol/L 4 7   CHLORIDE mmol/L 104   CO2 mmol/L 25   BUN mg/dL 4*   CREATININE mg/dL 0 67   CALCIUM mg/dL 8 8     Results from last 7 days   Lab Units 05/25/22  1215   INR  1 03           Imaging Studies: I have personally reviewed pertinent imaging studies  CT abdomen pelvis wo contrast    Result Date: 5/25/2022  Impression: Diffuse colonic and rectal wall thickening consistent with uncomplicated colitis/proctitis   Workstation performed: [id number]       Assessment and Plan:    53-year-old [place] female with history of non-specific bleeding disorder, depression, hypothyroidism admitted with diarrhea for 1-2 months     1) Chronic diarrhea  2) Abdominal cramping  3) Abnormal CT scan, colon     Her chronic diarrhea and abdominal cramping may be due to infectious colitis, IBD, microscopic colitis, celiac disease, IBS, other  Blood work grossly unremarkable  C diff toxin by PCR positive although toxin A+B negative which is more consistent with colonization without active infection  Stool enteric panel negative    CT abdomen pelvis wo contrast shows diffuse colonic and rectal wall thickening consistent with uncomplicated colitis/proctitis       -I had long discussion with patient's daughter at bedside  -we will continue oral vancomycin 125 mg every 6 hours for now  -we will plan for colonoscopy tomorrow to evaluate for pseudomembranes which could be consistent with active C diff infection, signs of inflammatory bowel disease, take random colon biopsies to rule out microscopic colitis  -continue clear liquid diet and give gentle MiraLax prep this evening  -NPO after midnight  -check celiac serologies for completeness

## 2022-05-26 NOTE — QUICK NOTE
Informed Willard Arceo  that patient's blood pressure has dipped down from 113/71 to 91/55  Pt has fluids running at 100cc/hr and had 1 loose BM  Will continue to monitor

## 2022-05-26 NOTE — UTILIZATION REVIEW
Initial Clinical Review    Admission: Date/Time/Statement:   Admission Orders (From admission, onward)     Ordered        05/25/22 1405  Place in Observation  Once                      05/26/22 1246  Inpatient Admission  Once        Transfer Service: Hospitalist       Question Answer Comment   Level of Care Med Surg    Estimated length of stay More than 2 Midnights    Certification I certify that inpatient services are medically necessary for this patient for a duration of greater than two midnights  See H&P and MD Progress Notes for additional information about the patient's course of treatment  05/26/22 1245   OBSERVATION  5/25  @  [address]  5/26  @  1245  The patient, admitted on an observation basis, will now require > 2 midnight hospital stay due to bloody, mucusy diarrhea      ED Arrival Information     Expected   -    Arrival   5/25/2022 10:53    Acuity   Urgent            Means of arrival   Walk-In    Escorted by   Self    Service   Hospitalist    Admission type   Urgent            Arrival complaint   diarrhea           Chief Complaint   Patient presents with    Diarrhea     Pt c/o diarrhea for a month has been to urgent care twice has been on medication but only helped a little bit , this morning there was some blood and mucus in her liquid stool, stool is yellow and green  C/o of lower abdominal discomfort with nausea,   ate calamari a month ago and since then, hx diverticulitis       Initial Presentation: 66 y o  female presents to ED from home with nausea, vomiting and diarrhea  Currently  Visiting from [facility], arrived in April  Had  A f ew episodes of blood  In BM's  The day of admission  Symptoms present for 1 month  Symptoms started after eating seafood  No  Other family members ill  Seen twice at Urgent Care and completed a  Course of  Po flagyl and clindamycin  No local  PCP  PMH  Is  Ill defined bleeding disorder, hypothyroidism and depression     Admit Observation with  Colitis and plan is   Monitor stool output, monitor labs, GI consult and wait stool studies  GI consult  ( 5/25)    This could be due to an infectious diarrhea, inflammatory bowel disease, microscopic colitis  We will check her stool studies including ova and parasites  If these are negative and her symptoms persist we will plan for colonoscopy on Friday  Since she is being empirically treated for Clostridium difficile colitis with oral vancomycin, I am okay with her taking Imodium as needed to try to slow down her bowels  However if her stool tests come back positive for infection, we may stop her Imodium as needed at that time  5/26   IP ADMISSION  Stool  Positive  C/diff  On po vanco Q 6 hrs  Continue  IVF for ongoing diarrhea  Still with diarrhea, less frequency         ED Triage Vitals   Temperature Pulse Respirations Blood Pressure SpO2   05/25/22 1134 05/25/22 1134 05/25/22 1134 05/25/22 1134 05/25/22 1134   98 5 °F (36 9 °C) 84 18 111/74 99 %      Temp Source Heart Rate Source Patient Position - Orthostatic VS BP Location FiO2 (%)   05/25/22 1134 05/25/22 1134 05/25/22 1134 05/25/22 1134 --   Oral Monitor Sitting Right arm       Pain Score       05/25/22 1740       No Pain          Wt Readings from Last 1 Encounters:   05/26/22 49 kg (108 lb)     Additional Vital Signs:   97 1 °F (36 2 °C) Abnormal  70 18 99/64 76 96 % -- --    05/26/22 0615 -- 78 -- 94/61 -- -- -- --   05/26/22 0320 97 5 °F (36 4 °C) 75 18 91/55 -- -- -- --   05/25/22 23:38:51 97 4 °F (36 3 °C) Abnormal  71 -- 113/71  64 97 % -- --   BP: rechecked at 05/25/22 2338   05/25/22 23:38:02 97 4 °F (36 3 °C) Abnormal  72 18 90/54 66 97 % -- --   05/25/22 1938 97 1 °F (36 2 °C) Abnormal  78 -- 100/56 -- 98 % None (Room air) --   05/25/22 15:56:34 98 3 °F (36 8 °C) -- -- 100/66 77 -- -- --   05/25/22 1444 -- 79 18 119/65 -- 98 % None (Room air) Lying   05/25/22 1210 -- -- -- -- -- -- None (Room air) --   05/25/22 1134 98 5 °F (36 9 °C) 84 18 111/74 -- 99 % None (Room air) Sitting       Pertinent Labs/Diagnostic Test Results:   CT abdomen pelvis wo contrast   Final Result by Jaiden Camarena MD (05/25 1254)   Diffuse colonic and rectal wall thickening consistent with uncomplicated colitis/proctitis                 Workstation performed: [id number]               Results from last 7 days   Lab Units 05/26/22  0452 05/25/22  1212   WBC Thousand/uL 5 50 9 61   HEMOGLOBIN g/dL 11 7 13 3   HEMATOCRIT % 36 3 40 3   PLATELETS Thousands/uL 278 281   NEUTROS ABS Thousands/µL 3 16 6 57         Results from last 7 days   Lab Units 05/26/22  0452 05/25/22  1212   SODIUM mmol/L 135* 132*   POTASSIUM mmol/L 4 7 4 8   CHLORIDE mmol/L 104 98*   CO2 mmol/L 25 27   ANION GAP mmol/L 6 7   BUN mg/dL 4* 9   CREATININE mg/dL 0 67 0 63   EGFR ml/min/1 73sq m 88 89   CALCIUM mg/dL 8 8 8 9             Results from last 7 days   Lab Units 05/26/22  0452 05/25/22  1212   GLUCOSE RANDOM mg/dL 99 100               Results from last 7 days   Lab Units 05/25/22  1215   PROTIME seconds 13 3   INR  1 03   PTT seconds 39*               Results from last 7 days   Lab Units 05/25/22  1212   CRP mg/L 89 2*           Results from last 7 days   Lab Units 05/25/22  1416   C DIFF TOXIN B BY PCR  Positive*           Admitting Diagnosis: Diarrhea [R19 7]  Colitis [K52 9]  Age/Sex: 66 y o  female  Admission Orders:  Scheduled Medications:  escitalopram, 10 mg, Oral, Daily  levothyroxine, 50 mcg, Oral, Early Morning  saccharomyces boulardii, 250 mg, Oral, BID  vancomycin, 125 mg, Oral, Q6H [address]      Continuous IV Infusions:  sodium chloride, 100 mL/hr, Intravenous, Continuous      PRN Meds:  acetaminophen, 650 mg, Oral, Q6H PRN  aluminum-magnesium hydroxide-simethicone, 30 mL, Oral, Q6H PRN  loperamide, 2 mg, Oral, 4x Daily PRN  ondansetron, 4 mg, Intravenous, Q6H PRN        IP CONSULT TO GASTROENTEROLOGY    Network Utilization Review Department  ATTENTION: Please call with any questions or concerns to [phone] and carefully listen to the prompts so that you are directed to the right person  All voicemails are confidential   [place] all requests for admission clinical reviews, approved or denied determinations and any other requests to dedicated fax number below belonging to the campus where the patient is receiving treatment   List of dedicated fax numbers for the Facilities:  [address] DENIALS (Administrative/Medical Necessity) [phone]   [address] (Maternity/NICU/Pediatrics) [phone]   [address]  [address] Medical [place] [address] [id number] [address] [place] [address] [place] [address] [phone]

## 2022-05-26 NOTE — PROGRESS NOTES
[facility]  Progress Note - Paul Lujan 1950, 66 y o  female MRN: [id number]  Unit/Bed#: E5 -01 Encounter: [id number]  Primary Care Provider: No primary care provider on file  Date and time admitted to hospital: 5/25/2022 11:51 AM    * Colitis  Assessment & Plan  · Around 2 month history of diarrhea per patient/daughter and GI note  · CT on admission revealed colitis/proctitis per the results report  · Symptoms reportedly started after patient ate seafood, however other family members ate the same meal and did not get sick  Patient was apparently treated with Cipro and Flagyl without improvement  · C  Diff PCR positive but EIA negative  · Patient with bloody, mucusy stools   · On PO Vancomycin Q6hr  · Continue IVF hydration given ongoing diarrhea  · Patient is visiting from [facility]     Bleeding disorder [facility])  Assessment & Plan  · Ill-defined bleeding disorder, patient's daughter reported history bleeding after thyroidectomy in the past  · Patient with blood in stools per family report  Discontinue Lovenox    Hypothyroidism  Assessment & Plan  · Continue Synthroid    Depression  Assessment & Plan  · Continue Lexapro      VTE Pharmacologic Prophylaxis: VTE Score: 3 Moderate Risk (Score 3-4) - Pharmacological DVT Prophylaxis Contraindicated  Sequential Compression Devices Ordered  Patient Centered Rounds: I performed bedside rounds with nursing staff today  Discussions with Specialists or Other Care Team Provider: GI AP    Education and Discussions with Family / Patient: Updated  (daughter) at bedside  Time Spent for Care: 30 minutes  More than 50% of total time spent on counseling and coordination of care as described above      Current Length of Stay: 0 day(s)  Current Patient Status: Observation   Certification Statement: The patient, admitted on an observation basis, will now require > 2 midnight hospital stay due to bloody, mucusy diarrhea  Discharge Plan: pending further GI recs/clearance    Code Status: Level 1 - Full Code    Subjective:   Ms Destiny Thurman is Vietnamese speaking, visiting from [facility]  Her daughter at bedside translates  She reports continued diarrhea, little less frequently, but with mucus and blood    Objective:     Vitals:   Temp (24hrs), Av 6 °F (36 4 °C), Min:97 1 °F (36 2 °C), Max:98 5 °F (36 9 °C)    Temp:  [97 1 °F (36 2 °C)-98 5 °F (36 9 °C)] 97 1 °F (36 2 °C)  HR:  [70-84] 70  Resp:  [18] 18  BP: ()/(54-74) 99/64  SpO2:  [96 %-99 %] 96 %  Body mass index is 20 41 kg/m²  Input and Output Summary (last 24 hours):   No intake or output data in the 24 hours ending 22 1129    Physical Exam:   Physical Exam  Vitals and nursing note reviewed  Constitutional:       Comments: Patient seen sitting in bedside chair, daughter present at bedside, NAD   Cardiovascular:      Rate and Rhythm: Normal rate and regular rhythm  Pulmonary:      Effort: Pulmonary effort is normal  No respiratory distress  Breath sounds: Normal breath sounds  Abdominal:      General: Bowel sounds are normal       Palpations: Abdomen is soft  Tenderness: There is no abdominal tenderness  Musculoskeletal:      Right lower leg: No edema  Left lower leg: No edema  Skin:     General: Skin is warm  Neurological:      Mental Status: She is alert and oriented to person, place, and time     Psychiatric:         Mood and Affect: Mood normal          Behavior: Behavior normal           Additional Data:     Labs:  Results from last 7 days   Lab Units 22  0452   WBC Thousand/uL 5 50   HEMOGLOBIN g/dL 11 7   HEMATOCRIT % 36 3   PLATELETS Thousands/uL 278   NEUTROS PCT % 58   LYMPHS PCT % 24   MONOS PCT % 13*   EOS PCT % 4     Results from last 7 days   Lab Units 22  0452   SODIUM mmol/L 135*   POTASSIUM mmol/L 4 7   CHLORIDE mmol/L 104   CO2 mmol/L 25   BUN mg/dL 4*   CREATININE mg/dL 0 67   ANION GAP mmol/L 6 CALCIUM mg/dL 8 8   GLUCOSE RANDOM mg/dL 99     Results from last 7 days   Lab Units 05/25/22  1215   INR  1 03                   Lines/Drains:  Invasive Devices  Report    Peripheral Intravenous Line  Duration           Peripheral IV 05/25/22 Right Antecubital <1 day                      Imaging: Reviewed radiology reports from this admission including: abdominal/pelvic CT    Recent Cultures (last 7 days):   Results from last 7 days   Lab Units 05/25/22  1416   C DIFF TOXIN B BY PCR  Positive*       Last 24 Hours Medication List:   Current Facility-Administered Medications   Medication Dose Route Frequency Provider Last Rate    acetaminophen  650 mg Oral Q6H PRN Jan Diaz, DO      aluminum-magnesium hydroxide-simethicone  30 mL Oral Q6H PRN Lalo Veloz,       escitalopram  10 mg Oral Daily Jan Cancino,       levothyroxine  50 mcg Oral Early Morning Jan Diaz,       loperamide  2 mg Oral 4x Daily PRN Kervin Yates MD      ondansetron  4 mg Intravenous Q6H PRN Jan Yu,       saccharomyces boulardii  250 mg Oral BID Jan Diaz,       sodium chloride  100 mL/hr Intravenous Continuous Yael Kaufman PA-C      vancomycin  125 mg Oral Q6H [address] Lalo Veloz DO          Today, Patient Was Seen By: Yael Kaufman PA-C    **Please Note: This note may have been constructed using a voice recognition system  **

## 2022-05-26 NOTE — ASSESSMENT & PLAN NOTE
· Around 2 month history of diarrhea per patient/daughter and GI note  · CT on admission revealed colitis/proctitis per the results report  · Symptoms reportedly started after patient ate seafood, however other family members ate the same meal and did not get sick  Patient was apparently treated with Cipro and Flagyl without improvement  · C   Diff PCR positive but EIA negative  · Patient with bloody, mucusy stools   · On PO Vancomycin Q6hr  · Continue IVF hydration given ongoing diarrhea  · Patient is visiting from [facility]

## 2022-05-26 NOTE — PLAN OF CARE
Problem: GASTROINTESTINAL - ADULT  Goal: Minimal or absence of nausea and/or vomiting  Description: INTERVENTIONS:  - Administer IV fluids if ordered to ensure adequate hydration  - Maintain NPO status until nausea and vomiting are resolved  - Nasogastric tube if ordered  - Administer ordered antiemetic medications as needed  - Provide nonpharmacologic comfort measures as appropriate  - Advance diet as tolerated, if ordered  - Consider nutrition services referral to assist patient with adequate nutrition and appropriate food choices  Outcome: Progressing  Goal: Maintains or returns to baseline bowel function  Description: INTERVENTIONS:  - Assess bowel function  - Encourage oral fluids to ensure adequate hydration  - Administer IV fluids if ordered to ensure adequate hydration  - Administer ordered medications as needed  - Encourage mobilization and activity  - Consider nutritional services referral to assist patient with adequate nutrition and appropriate food choices  Outcome: Progressing  Goal: Maintains adequate nutritional intake  Description: INTERVENTIONS:  - Monitor percentage of each meal consumed  - Identify factors contributing to decreased intake, treat as appropriate  - Assist with meals as needed  - Monitor I&O, weight, and lab values if indicated  - Obtain nutrition services referral as needed  Outcome: Progressing     Problem: PAIN - ADULT  Goal: Verbalizes/displays adequate comfort level or baseline comfort level  Description: Interventions:  - Encourage patient to monitor pain and request assistance  - Assess pain using appropriate pain scale  - Administer analgesics based on type and severity of pain and evaluate response  - Implement non-pharmacological measures as appropriate and evaluate response  - Consider cultural and social influences on pain and pain management  - Notify physician/advanced practitioner if interventions unsuccessful or patient reports new pain  Outcome: Progressing Problem: INFECTION - ADULT  Goal: Absence or prevention of progression during hospitalization  Description: INTERVENTIONS:  - Assess and monitor for signs and symptoms of infection  - Monitor lab/diagnostic results  - Monitor all insertion sites, i e  indwelling lines, tubes, and drains  - Monitor endotracheal if appropriate and nasal secretions for changes in amount and color  - [place] appropriate cooling/warming therapies per order  - Administer medications as ordered  - Instruct and encourage patient and family to use good hand hygiene technique  - Identify and instruct in appropriate isolation precautions for identified infection/condition  Outcome: Progressing     Problem: SAFETY ADULT  Goal: Patient will remain free of falls  Description: INTERVENTIONS:  - Educate patient/family on patient safety including physical limitations  - Instruct patient to call for assistance with activity   - Consult OT/PT to assist with strengthening/mobility   - Keep Call bell within reach  - Keep bed low and locked with side rails adjusted as appropriate  - Keep care items and personal belongings within reach  - Initiate and maintain comfort rounds  - Make Fall Risk Sign visible to staff  - Apply yellow socks and bracelet for high fall risk patients  - Consider moving patient to room near nurses station  Outcome: Progressing  Goal: Maintain or return to baseline ADL function  Description: INTERVENTIONS:  -  Assess patient's ability to carry out ADLs; assess patient's baseline for ADL function and identify physical deficits which impact ability to perform ADLs (bathing, care of mouth/teeth, toileting, grooming, dressing, etc )  - Assess/evaluate cause of self-care deficits   - Assess range of motion  - Assess patient's mobility; develop plan if impaired  - Assess patient's need for assistive devices and provide as appropriate  - Encourage maximum independence but intervene and supervise when necessary  - Involve family in performance of ADLs  - Assess for home care needs following discharge   - Consider OT consult to assist with ADL evaluation and planning for discharge  - Provide patient education as appropriate  Outcome: Progressing  Goal: Maintains/Returns to pre admission functional level  Description: INTERVENTIONS:  - Perform BMAT or MOVE assessment daily    - Set and communicate daily mobility goal to care team and patient/family/caregiver  - Collaborate with rehabilitation services on mobility goals if consulted  - Out of bed for toileting  - Record patient progress and toleration of activity level   Outcome: Progressing     Problem: DISCHARGE PLANNING  Goal: Discharge to home or other facility with appropriate resources  Description: INTERVENTIONS:  - Identify barriers to discharge w/patient and caregiver  - Arrange for needed discharge resources and transportation as appropriate  - Identify discharge learning needs (meds, wound care, etc )  - Arrange for interpretive services to assist at discharge as needed  - Refer to Case Management Department for coordinating discharge planning if the patient needs post-hospital services based on physician/advanced practitioner order or complex needs related to functional status, cognitive ability, or social support system  Outcome: Progressing     Problem: Knowledge Deficit  Goal: Patient/family/caregiver demonstrates understanding of disease process, treatment plan, medications, and discharge instructions  Description: Complete learning assessment and assess knowledge base    Interventions:  - Provide teaching at level of understanding  - Provide teaching via preferred learning methods  Outcome: Progressing     Problem: MOBILITY - ADULT  Goal: Maintain or return to baseline ADL function  Description: INTERVENTIONS:  -  Assess patient's ability to carry out ADLs; assess patient's baseline for ADL function and identify physical deficits which impact ability to perform ADLs (bathing, care of mouth/teeth, toileting, grooming, dressing, etc )  - Assess/evaluate cause of self-care deficits   - Assess range of motion  - Assess patient's mobility; develop plan if impaired  - Assess patient's need for assistive devices and provide as appropriate  - Encourage maximum independence but intervene and supervise when necessary  - Involve family in performance of ADLs  - Assess for home care needs following discharge   - Consider OT consult to assist with ADL evaluation and planning for discharge  - Provide patient education as appropriate  Outcome: Progressing  Goal: Maintains/Returns to pre admission functional level  Description: INTERVENTIONS:  - Perform BMAT or MOVE assessment daily    - Set and communicate daily mobility goal to care team and patient/family/caregiver     - Collaborate with rehabilitation services on mobility goals if consulted    - Out of bed for toileting  - Record patient progress and toleration of activity level   Outcome: Progressing

## 2022-05-27 ENCOUNTER — ANESTHESIA (INPATIENT)
Dept: GASTROENTEROLOGY | Facility: HOSPITAL | Age: 72
DRG: 372 | End: 2022-05-27
Payer: COMMERCIAL

## 2022-05-27 ENCOUNTER — APPOINTMENT (INPATIENT)
Dept: GASTROENTEROLOGY | Facility: HOSPITAL | Age: 72
DRG: 372 | End: 2022-05-27
Payer: COMMERCIAL

## 2022-05-27 LAB
ANION GAP SERPL CALCULATED.3IONS-SCNC: 7 MMOL/L (ref 4–13)
BUN SERPL-MCNC: 2 MG/DL (ref 5–25)
CALCIUM SERPL-MCNC: 8.9 MG/DL (ref 8.3–10.1)
CHLORIDE SERPL-SCNC: 108 MMOL/L (ref 100–108)
CO2 SERPL-SCNC: 23 MMOL/L (ref 21–32)
CREAT SERPL-MCNC: 0.53 MG/DL (ref 0.6–1.3)
ERYTHROCYTE [DISTWIDTH] IN BLOOD BY AUTOMATED COUNT: 13.5 % (ref 11.6–15.1)
GFR SERPL CREATININE-BSD FRML MDRD: 95 ML/MIN/1.73SQ M
GLUCOSE SERPL-MCNC: 98 MG/DL (ref 65–140)
HCT VFR BLD AUTO: 36.3 % (ref 34.8–46.1)
HGB BLD-MCNC: 11.7 G/DL (ref 11.5–15.4)
IGA SERPL-MCNC: 303 MG/DL (ref 70–400)
MCH RBC QN AUTO: 30 PG (ref 26.8–34.3)
MCHC RBC AUTO-ENTMCNC: 32.2 G/DL (ref 31.4–37.4)
MCV RBC AUTO: 93 FL (ref 82–98)
PLATELET # BLD AUTO: 274 THOUSANDS/UL (ref 149–390)
PMV BLD AUTO: 8.7 FL (ref 8.9–12.7)
POTASSIUM SERPL-SCNC: 4.2 MMOL/L (ref 3.5–5.3)
RBC # BLD AUTO: 3.9 MILLION/UL (ref 3.81–5.12)
SODIUM SERPL-SCNC: 138 MMOL/L (ref 136–145)
WBC # BLD AUTO: 5.09 THOUSAND/UL (ref 4.31–10.16)

## 2022-05-27 PROCEDURE — 88305 TISSUE EXAM BY PATHOLOGIST: CPT | Performed by: PATHOLOGY

## 2022-05-27 PROCEDURE — 45380 COLONOSCOPY AND BIOPSY: CPT | Performed by: INTERNAL MEDICINE

## 2022-05-27 PROCEDURE — 0DBL8ZX EXCISION OF TRANSVERSE COLON, VIA NATURAL OR ARTIFICIAL OPENING ENDOSCOPIC, DIAGNOSTIC: ICD-10-PCS | Performed by: INTERNAL MEDICINE

## 2022-05-27 PROCEDURE — 0DBP8ZX EXCISION OF RECTUM, VIA NATURAL OR ARTIFICIAL OPENING ENDOSCOPIC, DIAGNOSTIC: ICD-10-PCS | Performed by: INTERNAL MEDICINE

## 2022-05-27 PROCEDURE — 45385 COLONOSCOPY W/LESION REMOVAL: CPT | Performed by: INTERNAL MEDICINE

## 2022-05-27 PROCEDURE — 82784 ASSAY IGA/IGD/IGG/IGM EACH: CPT | Performed by: PHYSICIAN ASSISTANT

## 2022-05-27 PROCEDURE — 86364 TISS TRNSGLTMNASE EA IG CLAS: CPT | Performed by: PHYSICIAN ASSISTANT

## 2022-05-27 PROCEDURE — 99232 SBSQ HOSP IP/OBS MODERATE 35: CPT | Performed by: INTERNAL MEDICINE

## 2022-05-27 PROCEDURE — 80048 BASIC METABOLIC PNL TOTAL CA: CPT | Performed by: PHYSICIAN ASSISTANT

## 2022-05-27 PROCEDURE — 0DBM8ZX EXCISION OF DESCENDING COLON, VIA NATURAL OR ARTIFICIAL OPENING ENDOSCOPIC, DIAGNOSTIC: ICD-10-PCS | Performed by: INTERNAL MEDICINE

## 2022-05-27 PROCEDURE — 0DBN8ZX EXCISION OF SIGMOID COLON, VIA NATURAL OR ARTIFICIAL OPENING ENDOSCOPIC, DIAGNOSTIC: ICD-10-PCS | Performed by: INTERNAL MEDICINE

## 2022-05-27 PROCEDURE — 0DBK8ZX EXCISION OF ASCENDING COLON, VIA NATURAL OR ARTIFICIAL OPENING ENDOSCOPIC, DIAGNOSTIC: ICD-10-PCS | Performed by: INTERNAL MEDICINE

## 2022-05-27 PROCEDURE — 85027 COMPLETE CBC AUTOMATED: CPT | Performed by: PHYSICIAN ASSISTANT

## 2022-05-27 RX ORDER — SIMETHICONE 20 MG/.3ML
EMULSION ORAL CODE/TRAUMA/SEDATION MEDICATION
Status: COMPLETED | OUTPATIENT
Start: 2022-05-27 | End: 2022-05-27

## 2022-05-27 RX ORDER — EPHEDRINE SULFATE 50 MG/ML
INJECTION INTRAVENOUS AS NEEDED
Status: DISCONTINUED | OUTPATIENT
Start: 2022-05-27 | End: 2022-05-27

## 2022-05-27 RX ORDER — LIDOCAINE HYDROCHLORIDE 20 MG/ML
INJECTION, SOLUTION EPIDURAL; INFILTRATION; INTRACAUDAL; PERINEURAL AS NEEDED
Status: DISCONTINUED | OUTPATIENT
Start: 2022-05-27 | End: 2022-05-27

## 2022-05-27 RX ORDER — PROPOFOL 10 MG/ML
INJECTION, EMULSION INTRAVENOUS AS NEEDED
Status: DISCONTINUED | OUTPATIENT
Start: 2022-05-27 | End: 2022-05-27

## 2022-05-27 RX ORDER — SODIUM CHLORIDE 9 MG/ML
25 INJECTION, SOLUTION INTRAVENOUS CONTINUOUS
Status: DISCONTINUED | OUTPATIENT
Start: 2022-05-27 | End: 2022-05-28

## 2022-05-27 RX ADMIN — PROPOFOL 50 MG: 10 INJECTION, EMULSION INTRAVENOUS at 15:39

## 2022-05-27 RX ADMIN — Medication 250 MG: at 18:36

## 2022-05-27 RX ADMIN — PROPOFOL 50 MG: 10 INJECTION, EMULSION INTRAVENOUS at 15:20

## 2022-05-27 RX ADMIN — Medication 125 MG: at 18:36

## 2022-05-27 RX ADMIN — Medication 40 MG: at 15:31

## 2022-05-27 RX ADMIN — PROPOFOL 50 MG: 10 INJECTION, EMULSION INTRAVENOUS at 15:25

## 2022-05-27 RX ADMIN — EPHEDRINE SULFATE 10 MG: 50 INJECTION, SOLUTION INTRAVENOUS at 15:50

## 2022-05-27 RX ADMIN — Medication 125 MG: at 06:11

## 2022-05-27 RX ADMIN — LEVOTHYROXINE SODIUM 50 MCG: 50 TABLET ORAL at 06:11

## 2022-05-27 RX ADMIN — PROPOFOL 100 MG: 10 INJECTION, EMULSION INTRAVENOUS at 15:17

## 2022-05-27 RX ADMIN — SODIUM CHLORIDE 25 ML/HR: 0.9 INJECTION, SOLUTION INTRAVENOUS at 14:25

## 2022-05-27 RX ADMIN — Medication 125 MG: at 23:26

## 2022-05-27 RX ADMIN — PROPOFOL 50 MG: 10 INJECTION, EMULSION INTRAVENOUS at 15:29

## 2022-05-27 RX ADMIN — LIDOCAINE HYDROCHLORIDE 50 MG: 20 INJECTION, SOLUTION EPIDURAL; INFILTRATION; INTRACAUDAL; PERINEURAL at 15:17

## 2022-05-27 RX ADMIN — Medication 125 MG: at 00:10

## 2022-05-27 RX ADMIN — PROPOFOL 50 MG: 10 INJECTION, EMULSION INTRAVENOUS at 15:34

## 2022-05-27 RX ADMIN — Medication 125 MG: at 13:11

## 2022-05-27 RX ADMIN — PROPOFOL 50 MG: 10 INJECTION, EMULSION INTRAVENOUS at 15:45

## 2022-05-27 RX ADMIN — SODIUM CHLORIDE 100 ML/HR: 0.9 INJECTION, SOLUTION INTRAVENOUS at 06:15

## 2022-05-27 NOTE — ASSESSMENT & PLAN NOTE
· Ill-defined bleeding disorder, patient's daughter reported history bleeding after thyroidectomy in the past  · Patient with blood in stools per family report

## 2022-05-27 NOTE — PLAN OF CARE
Problem: GASTROINTESTINAL - ADULT  Goal: Minimal or absence of nausea and/or vomiting  Description: INTERVENTIONS:  - Administer IV fluids if ordered to ensure adequate hydration  - Maintain NPO status until nausea and vomiting are resolved  - Nasogastric tube if ordered  - Administer ordered antiemetic medications as needed  - Provide nonpharmacologic comfort measures as appropriate  - Advance diet as tolerated, if ordered  - Consider nutrition services referral to assist patient with adequate nutrition and appropriate food choices  Outcome: Progressing  Goal: Maintains or returns to baseline bowel function  Description: INTERVENTIONS:  - Assess bowel function  - Encourage oral fluids to ensure adequate hydration  - Administer IV fluids if ordered to ensure adequate hydration  - Administer ordered medications as needed  - Encourage mobilization and activity  - Consider nutritional services referral to assist patient with adequate nutrition and appropriate food choices  Outcome: Progressing  Goal: Maintains adequate nutritional intake  Description: INTERVENTIONS:  - Monitor percentage of each meal consumed  - Identify factors contributing to decreased intake, treat as appropriate  - Assist with meals as needed  - Monitor I&O, weight, and lab values if indicated  - Obtain nutrition services referral as needed  Outcome: Progressing     Problem: PAIN - ADULT  Goal: Verbalizes/displays adequate comfort level or baseline comfort level  Description: Interventions:  - Encourage patient to monitor pain and request assistance  - Assess pain using appropriate pain scale  - Administer analgesics based on type and severity of pain and evaluate response  - Implement non-pharmacological measures as appropriate and evaluate response  - Consider cultural and social influences on pain and pain management  - Notify physician/advanced practitioner if interventions unsuccessful or patient reports new pain  Outcome: Progressing Problem: INFECTION - ADULT  Goal: Absence or prevention of progression during hospitalization  Description: INTERVENTIONS:  - Assess and monitor for signs and symptoms of infection  - Monitor lab/diagnostic results  - Monitor all insertion sites, i e  indwelling lines, tubes, and drains  - Monitor endotracheal if appropriate and nasal secretions for changes in amount and color  - [place] appropriate cooling/warming therapies per order  - Administer medications as ordered  - Instruct and encourage patient and family to use good hand hygiene technique  - Identify and instruct in appropriate isolation precautions for identified infection/condition  Outcome: Progressing     Problem: SAFETY ADULT  Goal: Patient will remain free of falls  Description: INTERVENTIONS:  - Educate patient/family on patient safety including physical limitations  - Instruct patient to call for assistance with activity   - Consult OT/PT to assist with strengthening/mobility   - Keep Call bell within reach  - Keep bed low and locked with side rails adjusted as appropriate  - Keep care items and personal belongings within reach  - Initiate and maintain comfort rounds  - Make Fall Risk Sign visible to staff  - Offer Toileting every  Hours, in advance of need  - Initiate/Maintain alarm  - Obtain necessary fall risk management equipment:   - Apply yellow socks and bracelet for high fall risk patients  - Consider moving patient to room near nurses station  Outcome: Progressing  Goal: Maintain or return to baseline ADL function  Description: INTERVENTIONS:  -  Assess patient's ability to carry out ADLs; assess patient's baseline for ADL function and identify physical deficits which impact ability to perform ADLs (bathing, care of mouth/teeth, toileting, grooming, dressing, etc )  - Assess/evaluate cause of self-care deficits   - Assess range of motion  - Assess patient's mobility; develop plan if impaired  - Assess patient's need for assistive devices and provide as appropriate  - Encourage maximum independence but intervene and supervise when necessary  - Involve family in performance of ADLs  - Assess for home care needs following discharge   - Consider OT consult to assist with ADL evaluation and planning for discharge  - Provide patient education as appropriate  Outcome: Progressing  Goal: Maintains/Returns to pre admission functional level  Description: INTERVENTIONS:  - Perform BMAT or MOVE assessment daily    - Set and communicate daily mobility goal to care team and patient/family/caregiver  - Collaborate with rehabilitation services on mobility goals if consulted  - Perform Range of Motion  times a day  - Reposition patient every  hours  - Dangle patient  times a day  - Stand patient  times a day  - Ambulate patient  times a day  - Out of bed to chair  times a day   - Out of bed for meals  times a day  - Out of bed for toileting  - Record patient progress and toleration of activity level   Outcome: Progressing     Problem: DISCHARGE PLANNING  Goal: Discharge to home or other facility with appropriate resources  Description: INTERVENTIONS:  - Identify barriers to discharge w/patient and caregiver  - Arrange for needed discharge resources and transportation as appropriate  - Identify discharge learning needs (meds, wound care, etc )  - Arrange for interpretive services to assist at discharge as needed  - Refer to Case Management Department for coordinating discharge planning if the patient needs post-hospital services based on physician/advanced practitioner order or complex needs related to functional status, cognitive ability, or social support system  Outcome: Progressing     Problem: Knowledge Deficit  Goal: Patient/family/caregiver demonstrates understanding of disease process, treatment plan, medications, and discharge instructions  Description: Complete learning assessment and assess knowledge base    Interventions:  - Provide teaching at level of understanding  - Provide teaching via preferred learning methods  Outcome: Progressing     Problem: MOBILITY - ADULT  Goal: Maintain or return to baseline ADL function  Description: INTERVENTIONS:  -  Assess patient's ability to carry out ADLs; assess patient's baseline for ADL function and identify physical deficits which impact ability to perform ADLs (bathing, care of mouth/teeth, toileting, grooming, dressing, etc )  - Assess/evaluate cause of self-care deficits   - Assess range of motion  - Assess patient's mobility; develop plan if impaired  - Assess patient's need for assistive devices and provide as appropriate  - Encourage maximum independence but intervene and supervise when necessary  - Involve family in performance of ADLs  - Assess for home care needs following discharge   - Consider OT consult to assist with ADL evaluation and planning for discharge  - Provide patient education as appropriate  Outcome: Progressing  Goal: Maintains/Returns to pre admission functional level  Description: INTERVENTIONS:  - Perform BMAT or MOVE assessment daily    - Set and communicate daily mobility goal to care team and patient/family/caregiver  - Collaborate with rehabilitation services on mobility goals if consulted  - Perform Range of Motion  times a day  - Reposition patient every  hours    - Dangle patient  times a day  - Stand patient  times a day  - Ambulate patient  times a day  - Out of bed to chair  times a day   - Out of bed for meals times a day  - Out of bed for toileting  - Record patient progress and toleration of activity level   Outcome: Progressing

## 2022-05-27 NOTE — PROGRESS NOTES
[address] [place] [address]  Progress Note - Delicia Strong 1950, 66 y o  female MRN: [id number]  Unit/Bed#: E5 -01 Encounter: [id number]  Primary Care Provider: No primary care provider on file  Date and time admitted to hospital: 5/25/2022 11:51 AM    * Colitis  Assessment & Plan  · Around 2 month history of diarrhea   · CT on admission revealed colitis/proctitis  · Symptoms reportedly started after patient ate seafood, however other family members ate the same meal and did not get sick  Patient was apparently treated with Cipro and Flagyl without improvement  · C  Diff PCR positive but EIA negative  · Patient with bloody, mucusy stools   · Stool enteric panel negative  · Severe serology pending  · On PO Vancomycin Q6hr  · Continue IVF hydration given ongoing diarrhea  · Will undergo colonoscopy today    Bleeding disorder ([facility] [address] )  Assessment & Plan  · Ill-defined bleeding disorder, patient's daughter reported history bleeding after thyroidectomy in the past  · Patient with blood in stools per family report  Hypothyroidism  Assessment & Plan  · Continue Synthroid    Depression  Assessment & Plan  · Continue Lexapro          VTE Pharmacologic Prophylaxis: VTE Score: 3 Moderate Risk (Score 3-4) - Pharmacological DVT Prophylaxis Contraindicated  Sequential Compression Devices Ordered  Patient Centered Rounds: I performed bedside rounds with nursing staff today  Discussions with Specialists or Other Care Team Provider:  Nursing, case management    Education and Discussions with Family / Patient:  Patient    Time Spent for Care: 30 minutes  More than 50% of total time spent on counseling and coordination of care as described above      Current Length of Stay: 1 day(s)  Current Patient Status: Inpatient   Certification Statement: The patient will continue to require additional inpatient hospital stay due to Diarrhea, pending colonoscopy today  Discharge Plan: Anticipate discharge tomorrow to home     Code Status: Level 1 - Full Code    Subjective:   Patient was seen evaluated bedside, no acute distress  Awaiting colonoscopy    Objective:     Vitals:   Temp (24hrs), Av 1 °F (36 7 °C), Min:97 6 °F (36 4 °C), Max:98 6 °F (37 °C)    Temp:  [97 6 °F (36 4 °C)-98 6 °F (37 °C)] 97 6 °F (36 4 °C)  HR:  [69-73] 72  Resp:  [18] 18  BP: ()/(57-73) 93/57  SpO2:  [96 %-99 %] 99 %  Body mass index is 20 45 kg/m²  Input and Output Summary (last 24 hours):   No intake or output data in the 24 hours ending 22 1250    Physical Exam:   Physical Exam  Constitutional:       General: She is not in acute distress  HENT:      Head: Atraumatic  Cardiovascular:      Rate and Rhythm: Normal rate and regular rhythm  Heart sounds: No murmur heard  No friction rub  No gallop  Pulmonary:      Effort: Pulmonary effort is normal  No respiratory distress  Breath sounds: Normal breath sounds  No wheezing  Abdominal:      General: Bowel sounds are normal  There is no distension  Palpations: Abdomen is soft  Tenderness: There is no abdominal tenderness  Musculoskeletal:         General: No swelling  Cervical back: Neck supple  Skin:     General: Skin is warm and dry  Neurological:      General: No focal deficit present  Mental Status: She is alert     Psychiatric:         Mood and Affect: Mood normal         Additional Data:     Labs:  Results from last 7 days   Lab Units 22  0554 22  0452   WBC Thousand/uL 5 09 5 50   HEMOGLOBIN g/dL 11 7 11 7   HEMATOCRIT % 36 3 36 3   PLATELETS Thousands/uL 274 278   NEUTROS PCT %  --  58   LYMPHS PCT %  --  24   MONOS PCT %  --  13*   EOS PCT %  --  4     Results from last 7 days   Lab Units 22  0554   SODIUM mmol/L 138   POTASSIUM mmol/L 4 2   CHLORIDE mmol/L 108   CO2 mmol/L 23   BUN mg/dL 2*   CREATININE mg/dL 0 53*   ANION GAP mmol/L 7   CALCIUM mg/dL 8 9   GLUCOSE RANDOM mg/dL 98     Results from last 7 days   Lab Units 05/25/22  1215   INR  1 03                   Lines/Drains:  Invasive Devices  Report    Peripheral Intravenous Line  Duration           Peripheral IV 05/25/22 Right Antecubital 2 days                      Imaging:   CT abdomen pelvis wo contrast   Final Result by Kaity Craft MD (05/25 1254)   Diffuse colonic and rectal wall thickening consistent with uncomplicated colitis/proctitis  Workstation performed: [id number]             Recent Cultures (last 7 days):   Results from last 7 days   Lab Units 05/25/22  1416   C DIFF TOXIN B BY PCR  Positive*       Last 24 Hours Medication List:   Current Facility-Administered Medications   Medication Dose Route Frequency Provider Last Rate    acetaminophen  650 mg Oral Q6H PRN Jan Diaz, DO      aluminum-magnesium hydroxide-simethicone  30 mL Oral Q6H PRN Maggie Galan DO      escitalopram  10 mg Oral Daily Jan Perera DO      levothyroxine  50 mcg Oral Early Morning Jan Diaz DO      loperamide  2 mg Oral 4x Daily PRN Lisette Miller MD      ondansetron  4 mg Intravenous Q6H PRN Maggie Galan DO      saccharomyces boulardii  250 mg Oral BID Jan Diaz DO      sodium chloride  100 mL/hr Intravenous Continuous Vernell Al PA-C 100 mL/hr (05/27/22 0615)    vancomycin  125 mg Oral Q6H [address] Jan Kennedy DO          Today, Patient Was Seen By: Oneida Ganser, MD    **Please Note: This note may have been constructed using a voice recognition system  **

## 2022-05-27 NOTE — ANESTHESIA PREPROCEDURE EVALUATION
Procedure:  COLONOSCOPY    Relevant Problems   ENDO   (+) Hypothyroidism      HEMATOLOGY   (+) Bleeding disorder (HCC)      NEURO/PSYCH   (+) Depression        Physical Exam    Airway    Mallampati score: I  TM Distance: <3 FB  Neck ROM: full     Dental       Cardiovascular  Rhythm: regular, Rate: normal, Cardiovascular exam normal    Pulmonary  Pulmonary exam normal     Other Findings        Anesthesia Plan  ASA Score- 2 Emergent    Anesthesia Type-     Plan Factors-Exercise tolerance (METS): >4 METS  Chart reviewed  Existing labs reviewed  Patient summary reviewed  Patient is not a current smoker  Patient did not smoke on day of surgery  Obstructive sleep apnea risk education given perioperatively  Induction- intravenous  Postoperative Plan-     Informed Consent- Anesthetic plan and risks discussed with patient  I personally reviewed this patient with the CRNA  Discussed and agreed on the Anesthesia Plan with the CRNA  Erinn Kimball

## 2022-05-27 NOTE — PLAN OF CARE
Problem: GASTROINTESTINAL - ADULT  Goal: Minimal or absence of nausea and/or vomiting  Description: INTERVENTIONS:  - Administer IV fluids if ordered to ensure adequate hydration  - Maintain NPO status until nausea and vomiting are resolved  - Nasogastric tube if ordered  - Administer ordered antiemetic medications as needed  - Provide nonpharmacologic comfort measures as appropriate  - Advance diet as tolerated, if ordered  - Consider nutrition services referral to assist patient with adequate nutrition and appropriate food choices  Outcome: Progressing  Goal: Maintains or returns to baseline bowel function  Description: INTERVENTIONS:  - Assess bowel function  - Encourage oral fluids to ensure adequate hydration  - Administer IV fluids if ordered to ensure adequate hydration  - Administer ordered medications as needed  - Encourage mobilization and activity  - Consider nutritional services referral to assist patient with adequate nutrition and appropriate food choices  Outcome: Progressing  Goal: Maintains adequate nutritional intake  Description: INTERVENTIONS:  - Monitor percentage of each meal consumed  - Identify factors contributing to decreased intake, treat as appropriate  - Assist with meals as needed  - Monitor I&O, weight, and lab values if indicated  - Obtain nutrition services referral as needed  Outcome: Progressing     Problem: PAIN - ADULT  Goal: Verbalizes/displays adequate comfort level or baseline comfort level  Description: Interventions:  - Encourage patient to monitor pain and request assistance  - Assess pain using appropriate pain scale  - Administer analgesics based on type and severity of pain and evaluate response  - Implement non-pharmacological measures as appropriate and evaluate response  - Consider cultural and social influences on pain and pain management  - Notify physician/advanced practitioner if interventions unsuccessful or patient reports new pain  Outcome: Progressing Problem: INFECTION - ADULT  Goal: Absence or prevention of progression during hospitalization  Description: INTERVENTIONS:  - Assess and monitor for signs and symptoms of infection  - Monitor lab/diagnostic results  - Monitor all insertion sites, i e  indwelling lines, tubes, and drains  - Monitor endotracheal if appropriate and nasal secretions for changes in amount and color  - [place] appropriate cooling/warming therapies per order  - Administer medications as ordered  - Instruct and encourage patient and family to use good hand hygiene technique  - Identify and instruct in appropriate isolation precautions for identified infection/condition  Outcome: Progressing     Problem: SAFETY ADULT  Goal: Patient will remain free of falls  Description: INTERVENTIONS:  - Educate patient/family on patient safety including physical limitations  - Instruct patient to call for assistance with activity   - Consult OT/PT to assist with strengthening/mobility   - Keep Call bell within reach  - Keep bed low and locked with side rails adjusted as appropriate  - Keep care items and personal belongings within reach  - Initiate and maintain comfort rounds  - Make Fall Risk Sign visible to staff  - Offer Toileting every 2 Hours, in advance of need  - Apply yellow socks and bracelet for high fall risk patients  - Consider moving patient to room near nurses station  Outcome: Progressing  Goal: Maintain or return to baseline ADL function  Description: INTERVENTIONS:  -  Assess patient's ability to carry out ADLs; assess patient's baseline for ADL function and identify physical deficits which impact ability to perform ADLs (bathing, care of mouth/teeth, toileting, grooming, dressing, etc )  - Assess/evaluate cause of self-care deficits   - Assess range of motion  - Assess patient's mobility; develop plan if impaired  - Assess patient's need for assistive devices and provide as appropriate  - Encourage maximum independence but intervene and supervise when necessary  - Involve family in performance of ADLs  - Assess for home care needs following discharge   - Consider OT consult to assist with ADL evaluation and planning for discharge  - Provide patient education as appropriate  Outcome: Progressing  Goal: Maintains/Returns to pre admission functional level  Description: INTERVENTIONS:  - Perform BMAT or MOVE assessment daily    - Set and communicate daily mobility goal to care team and patient/family/caregiver  - Collaborate with rehabilitation services on mobility goals if consulted  - Perform Range of Motion 3 times a day  - Reposition patient every 2 hours  - Dangle patient 3 times a day  - Stand patient 3 times a day  - Ambulate patient 3 times a day  - Out of bed to chair 3 times a day   - Out of bed for meals 3 times a day  - Out of bed for toileting  - Record patient progress and toleration of activity level   Outcome: Progressing     Problem: DISCHARGE PLANNING  Goal: Discharge to home or other facility with appropriate resources  Description: INTERVENTIONS:  - Identify barriers to discharge w/patient and caregiver  - Arrange for needed discharge resources and transportation as appropriate  - Identify discharge learning needs (meds, wound care, etc )  - Arrange for interpretive services to assist at discharge as needed  - Refer to Case Management Department for coordinating discharge planning if the patient needs post-hospital services based on physician/advanced practitioner order or complex needs related to functional status, cognitive ability, or social support system  Outcome: Progressing     Problem: Knowledge Deficit  Goal: Patient/family/caregiver demonstrates understanding of disease process, treatment plan, medications, and discharge instructions  Description: Complete learning assessment and assess knowledge base    Interventions:  - Provide teaching at level of understanding  - Provide teaching via preferred learning methods  Outcome: Progressing     Problem: MOBILITY - ADULT  Goal: Maintain or return to baseline ADL function  Description: INTERVENTIONS:  -  Assess patient's ability to carry out ADLs; assess patient's baseline for ADL function and identify physical deficits which impact ability to perform ADLs (bathing, care of mouth/teeth, toileting, grooming, dressing, etc )  - Assess/evaluate cause of self-care deficits   - Assess range of motion  - Assess patient's mobility; develop plan if impaired  - Assess patient's need for assistive devices and provide as appropriate  - Encourage maximum independence but intervene and supervise when necessary  - Involve family in performance of ADLs  - Assess for home care needs following discharge   - Consider OT consult to assist with ADL evaluation and planning for discharge  - Provide patient education as appropriate  Outcome: Progressing  Goal: Maintains/Returns to pre admission functional level  Description: INTERVENTIONS:  - Perform BMAT or MOVE assessment daily    - Set and communicate daily mobility goal to care team and patient/family/caregiver  - Collaborate with rehabilitation services on mobility goals if consulted  - Perform Range of Motion 3 times a day  - Reposition patient every 2 hours    - Dangle patient 3 times a day  - Stand patient 3 times a day  - Ambulate patient 3 times a day  - Out of bed to chair 3 times a day   - Out of bed for meals 3 times a day  - Out of bed for toileting  - Record patient progress and toleration of activity level   Outcome: Progressing

## 2022-05-27 NOTE — ASSESSMENT & PLAN NOTE
· Around 2 month history of diarrhea   · CT on admission revealed colitis/proctitis  · Symptoms reportedly started after patient ate seafood, however other family members ate the same meal and did not get sick  Patient was apparently treated with Cipro and Flagyl without improvement  · C   Diff PCR positive but EIA negative  · Patient with bloody, mucusy stools   · Stool enteric panel negative  · Severe serology pending  · On PO Vancomycin Q6hr  · Continue IVF hydration given ongoing diarrhea  · Will undergo colonoscopy today

## 2022-05-28 VITALS
RESPIRATION RATE: 19 BRPM | HEART RATE: 74 BPM | SYSTOLIC BLOOD PRESSURE: 103 MMHG | BODY MASS INDEX: 20.81 KG/M2 | DIASTOLIC BLOOD PRESSURE: 61 MMHG | OXYGEN SATURATION: 97 % | HEIGHT: 61 IN | WEIGHT: 110.23 LBS | TEMPERATURE: 98.1 F

## 2022-05-28 LAB
ANION GAP SERPL CALCULATED.3IONS-SCNC: 5 MMOL/L (ref 4–13)
BASOPHILS # BLD MANUAL: 0 THOUSAND/UL (ref 0–0.1)
BASOPHILS NFR MAR MANUAL: 0 % (ref 0–1)
BUN SERPL-MCNC: 5 MG/DL (ref 5–25)
CALCIUM SERPL-MCNC: 8.8 MG/DL (ref 8.3–10.1)
CHLORIDE SERPL-SCNC: 107 MMOL/L (ref 100–108)
CO2 SERPL-SCNC: 27 MMOL/L (ref 21–32)
CREAT SERPL-MCNC: 0.65 MG/DL (ref 0.6–1.3)
EOSINOPHIL # BLD MANUAL: 0.14 THOUSAND/UL (ref 0–0.4)
EOSINOPHIL NFR BLD MANUAL: 3 % (ref 0–6)
ERYTHROCYTE [DISTWIDTH] IN BLOOD BY AUTOMATED COUNT: 13.6 % (ref 11.6–15.1)
GFR SERPL CREATININE-BSD FRML MDRD: 88 ML/MIN/1.73SQ M
GLUCOSE SERPL-MCNC: 107 MG/DL (ref 65–140)
HCT VFR BLD AUTO: 35.4 % (ref 34.8–46.1)
HGB BLD-MCNC: 11.6 G/DL (ref 11.5–15.4)
LG PLATELETS BLD QL SMEAR: PRESENT
LYMPHOCYTES # BLD AUTO: 1.1 THOUSAND/UL (ref 0.6–4.47)
LYMPHOCYTES # BLD AUTO: 23 % (ref 14–44)
MAGNESIUM SERPL-MCNC: 1.9 MG/DL (ref 1.6–2.6)
MCH RBC QN AUTO: 31 PG (ref 26.8–34.3)
MCHC RBC AUTO-ENTMCNC: 32.8 G/DL (ref 31.4–37.4)
MCV RBC AUTO: 95 FL (ref 82–98)
MONOCYTES # BLD AUTO: 0.38 THOUSAND/UL (ref 0–1.22)
MONOCYTES NFR BLD: 8 % (ref 4–12)
NEUTROPHILS # BLD MANUAL: 3.15 THOUSAND/UL (ref 1.85–7.62)
NEUTS SEG NFR BLD AUTO: 66 % (ref 43–75)
PLATELET # BLD AUTO: 286 THOUSANDS/UL (ref 149–390)
PLATELET BLD QL SMEAR: ADEQUATE
PMV BLD AUTO: 8.9 FL (ref 8.9–12.7)
POTASSIUM SERPL-SCNC: 4.3 MMOL/L (ref 3.5–5.3)
RBC # BLD AUTO: 3.74 MILLION/UL (ref 3.81–5.12)
RBC MORPH BLD: NORMAL
SODIUM SERPL-SCNC: 139 MMOL/L (ref 136–145)
TTG IGA SER-ACNC: <2 U/ML (ref 0–3)
WBC # BLD AUTO: 4.77 THOUSAND/UL (ref 4.31–10.16)

## 2022-05-28 PROCEDURE — 83735 ASSAY OF MAGNESIUM: CPT | Performed by: INTERNAL MEDICINE

## 2022-05-28 PROCEDURE — 85007 BL SMEAR W/DIFF WBC COUNT: CPT | Performed by: INTERNAL MEDICINE

## 2022-05-28 PROCEDURE — 85027 COMPLETE CBC AUTOMATED: CPT | Performed by: INTERNAL MEDICINE

## 2022-05-28 PROCEDURE — 99239 HOSP IP/OBS DSCHRG MGMT >30: CPT | Performed by: INTERNAL MEDICINE

## 2022-05-28 PROCEDURE — 80048 BASIC METABOLIC PNL TOTAL CA: CPT | Performed by: INTERNAL MEDICINE

## 2022-05-28 RX ADMIN — ESCITALOPRAM OXALATE 10 MG: 10 TABLET ORAL at 09:16

## 2022-05-28 RX ADMIN — Medication 125 MG: at 06:05

## 2022-05-28 RX ADMIN — Medication 250 MG: at 09:16

## 2022-05-28 RX ADMIN — LEVOTHYROXINE SODIUM 50 MCG: 50 TABLET ORAL at 06:05

## 2022-05-28 NOTE — CASE MANAGEMENT
Case Management Discharge Planning Note    Patient name Delicia Strong  Location [address] [facility] [address] [facility] [place] Short [place] 94 20 56-* MRN [id number]  : 1950 Date 2022       Current Admission Date: 2022  Current Admission Diagnosis:Colitis   Patient Active Problem List    Diagnosis Date Noted    Diverticulitis     Colitis 2022    Depression 2022    Hypothyroidism 2022    Bleeding disorder ([address] ) 2022      LOS (days): 2  Geometric Mean LOS (GMLOS) (days):   Days to GMLOS:     OBJECTIVE:  Risk of Unplanned Readmission Score: 6 2         Current admission status: Inpatient   Preferred Pharmacy:   PATIENT/FAMILY REPORTS NO PREFERRED PHARMACY  No address on file      \[facility]\"" [address] [facility] [address] ,  [facility] [address] ,  [facility] [address]   Phone: [phone] Fax: [phone]    Primary Care Provider: No primary care provider on file  Primary Insurance: [employer]ESH [employer]VID[employer] PENDING  Secondary Insurance:     DISCHARGE DETAILS:    Discharge planning discussed with[de-identified] Rod Villagomez (Daughter) [phone] (M)     Freedom of Choice: Yes  Comments - Freedom of Choice: Pt will d/c today and return to her Dtrs home where she will stay for the duration of her visit to the [facility]  Abx script sent to Replaced by [facility]  OOP cpst [phone]  INDINGENT FORM FAXED TO  [phone]  From Dtrs description of providing her mother's financial information, it appears that PACs has started the [employer]ESH [employer]NAVIDES application on behalf of Pt  CM will contact Financial Counseling on  to follow up  and / or refer Pt for assistance [facility]  CM provided Dtr w/ Medical Records information -added to AVS- as Dtr states she will need documentation for her mother's international insurance plan  No other needs identified at this time  Dtr will transport Pt home by car       CM contacted family/caregiver?: Yes (Rod Villagomez (Daughter) [phone] (M))  Were Treatment Team discharge recommendations reviewed with patient/caregiver?: Yes  Did patient/caregiver verbalize understanding of patient care needs?: Yes  Were patient/caregiver advised of the risks associated with not following Treatment Team discharge recommendations?: Yes    Contacts  Patient Contacts: Brandin Herndon (Daughter) [phone] (M)  Relationship to Patient[de-identified] Family  Contact Method: Phone  Phone Number: Brandin Herndon (Daughter) [phone] Pennie Ruby  Reason/Outcome: Continuity of Care, Emergency Contact, Discharge Planning    Other Referral/Resources/Interventions Provided:  Financial Resources Provided: Financial Counselor, Medicaid    Treatment Team Recommendation: Home  Discharge Destination Plan[de-identified] Home  Transport at Discharge : Automobile    Transfer Mode: Wheelchair  Accompanied by: Family member

## 2022-05-28 NOTE — DISCHARGE SUMMARY
[address] [facility]  Discharge- [place] 1950, 66 y o  female MRN: [id number]  Unit/Bed#: E5 -01 Encounter: [id number]  Primary Care Provider: No primary care provider on file  Date and time admitted to hospital: 5/25/2022 11:51 AM    * Colitis  Assessment & Plan  · Around 2 month history of diarrhea, mucousy and blood tinged  · CT on admission revealed colitis/proctitis  · Symptoms reportedly started after patient ate seafood, however other family members ate the same meal and did not get sick  Patient was apparently treated with Cipro and Flagyl without improvement  · C  Diff PCR positive but EIA negative  · Stool enteric panel negative  · Celiac serology pending  · Fecal calprotectin pending  · Underwent colonoscopy which showed small rectal polyp which was removed, mild erythema in rectosigmoid and internal hemorrhoids  · Continue Vancomycin 125 mg Q6hr for total of 10 days  · Diarrhea significantly improved      Bleeding disorder (HCC)  Assessment & Plan  · Ill-defined bleeding disorder, patient's daughter reported history bleeding after thyroidectomy in the past  · Patient with blood tinged stools per family report  · Now significantly improved    Hypothyroidism  Assessment & Plan  · Continue Synthroid    Depression  Assessment & Plan  · Continue Lexapro        Medical Problems             Resolved Problems  Date Reviewed: 5/28/2022   None               Discharging Physician / Practitioner: Neil Purvis MD  PCP: No primary care provider on file    Admission Date:   Admission Orders (From admission, onward)     Ordered        05/26/22 1245  Inpatient Admission  Once            05/25/22 1405  Place in Observation  Once                      Discharge Date: 05/28/22    Consultations During Hospital Stay:  · Gastroenterology    Procedures Performed:   · Colonoscopy 05/27/2022  · IMPRESSION:  Small rectal polyp removed  Mild erythema in rectosigmoid  Internal hemorrhoids       Significant Findings / Test Results:   · CT abdomen pelvis without contrast 05/25/2022  FINDINGS:     ABDOMEN     LOWER CHEST:  No clinically significant abnormality identified in the visualized lower chest      LIVER/BILIARY TREE:  Unremarkable      GALLBLADDER:  No calcified gallstones  No pericholecystic inflammatory change      SPLEEN:  Unremarkable      PANCREAS:  Unremarkable      ADRENAL GLANDS:  Unremarkable      KIDNEYS/URETERS:  Unremarkable  No hydronephrosis      STOMACH AND BOWEL:  Diffuse colonic wall thickening especially in the left side with mild pericolonic inflammatory change as well as perirectal fat infiltration  Findings are concerning for uncomplicated colitis/proctitis  No collection identified in   this unenhanced study      APPENDIX:  A normal appendix was visualized      ABDOMINOPELVIC CAVITY:  No ascites  No pneumoperitoneum  No lymphadenopathy      VESSELS:  Unremarkable for patient's age      PELVIS     REPRODUCTIVE ORGANS:  Surgical changes of prior hysterectomy      URINARY BLADDER:  Unremarkable      ABDOMINAL WALL/INGUINAL REGIONS:  Unremarkable      OSSEOUS STRUCTURES:  No acute fracture or destructive osseous lesion      IMPRESSION:  Diffuse colonic and rectal wall thickening consistent with uncomplicated colitis/proctitis           Incidental Findings:   · none    Test Results Pending at Discharge (will require follow up):   · Celiac serology  · Fecal calprotectin  · Polyp biopsy     Outpatient Tests Requested:  · None    Complications:  None    Reason for Admission:  Persistent diarrhea    Hospital Course:   Marlen Nair is a 66 y o  female patient who originally presented to the hospital on 5/25/2022 due to persistent diarrhea 1-2 months  CT scan on admission revealed colitis/proctitis  C diff PCR positive but EIA negative  Stool enteric panel negative  Celiac serology pending, fecal calprotectin pending    She was started on vancomycin 125 mg q 6 hours with improvement of diarrhea  Underwent colonoscopy which showed small rectal polyp which was removed, mild erythema in sigmoid and internal hemorrhoids  Patient is tolerating diet, diarrhea improved  She will be discharged on vancomycin p o  For total of 10 days  Follow-up with primary care doctor and Gastroenterology  Please see above list of diagnoses and related plan for additional information  Condition at Discharge: good    Discharge Day Visit / Exam:   * Please refer to separate progress note for these details *    Discussion with Family: Updated  (daughter) at bedside  Discharge instructions/Information to patient and family:   See after visit summary for information provided to patient and family  Provisions for Follow-Up Care:  See after visit summary for information related to follow-up care and any pertinent home health orders  Disposition:   Home    Planned Readmission: no     Discharge Statement:  I spent 35 minutes discharging the patient  This time was spent on the day of discharge  I had direct contact with the patient on the day of discharge  Greater than 50% of the total time was spent examining patient, answering all patient questions, arranging and discussing plan of care with patient as well as directly providing post-discharge instructions  Additional time then spent on discharge activities  Discharge Medications:  See after visit summary for reconciled discharge medications provided to patient and/or family        **Please Note: This note may have been constructed using a voice recognition system**

## 2022-05-28 NOTE — PLAN OF CARE
Problem: GASTROINTESTINAL - ADULT  Goal: Minimal or absence of nausea and/or vomiting  Description: INTERVENTIONS:  - Administer IV fluids if ordered to ensure adequate hydration  - Maintain NPO status until nausea and vomiting are resolved  - Nasogastric tube if ordered  - Administer ordered antiemetic medications as needed  - Provide nonpharmacologic comfort measures as appropriate  - Advance diet as tolerated, if ordered  - Consider nutrition services referral to assist patient with adequate nutrition and appropriate food choices  Outcome: Progressing  Goal: Maintains or returns to baseline bowel function  Description: INTERVENTIONS:  - Assess bowel function  - Encourage oral fluids to ensure adequate hydration  - Administer IV fluids if ordered to ensure adequate hydration  - Administer ordered medications as needed  - Encourage mobilization and activity  - Consider nutritional services referral to assist patient with adequate nutrition and appropriate food choices  Outcome: Progressing  Goal: Maintains adequate nutritional intake  Description: INTERVENTIONS:  - Monitor percentage of each meal consumed  - Identify factors contributing to decreased intake, treat as appropriate  - Assist with meals as needed  - Monitor I&O, weight, and lab values if indicated  - Obtain nutrition services referral as needed  Outcome: Progressing     Problem: PAIN - ADULT  Goal: Verbalizes/displays adequate comfort level or baseline comfort level  Description: Interventions:  - Encourage patient to monitor pain and request assistance  - Assess pain using appropriate pain scale  - Administer analgesics based on type and severity of pain and evaluate response  - Implement non-pharmacological measures as appropriate and evaluate response  - Consider cultural and social influences on pain and pain management  - Notify physician/advanced practitioner if interventions unsuccessful or patient reports new pain  Outcome: Progressing Problem: INFECTION - ADULT  Goal: Absence or prevention of progression during hospitalization  Description: INTERVENTIONS:  - Assess and monitor for signs and symptoms of infection  - Monitor lab/diagnostic results  - Monitor all insertion sites, i e  indwelling lines, tubes, and drains  - Monitor endotracheal if appropriate and nasal secretions for changes in amount and color  - [place] appropriate cooling/warming therapies per order  - Administer medications as ordered  - Instruct and encourage patient and family to use good hand hygiene technique  - Identify and instruct in appropriate isolation precautions for identified infection/condition  Outcome: Progressing     Problem: SAFETY ADULT  Goal: Patient will remain free of falls  Description: INTERVENTIONS:  - Educate patient/family on patient safety including physical limitations  - Instruct patient to call for assistance with activity   - Consult OT/PT to assist with strengthening/mobility   - Keep Call bell within reach  - Keep bed low and locked with side rails adjusted as appropriate  - Keep care items and personal belongings within reach  - Initiate and maintain comfort rounds  - Make Fall Risk Sign visible to staff  - Offer Toileting every 2 Hours, in advance of need  - Apply yellow socks and bracelet for high fall risk patients  - Consider moving patient to room near nurses station  Outcome: Progressing  Goal: Maintain or return to baseline ADL function  Description: INTERVENTIONS:  -  Assess patient's ability to carry out ADLs; assess patient's baseline for ADL function and identify physical deficits which impact ability to perform ADLs (bathing, care of mouth/teeth, toileting, grooming, dressing, etc )  - Assess/evaluate cause of self-care deficits   - Assess range of motion  - Assess patient's mobility; develop plan if impaired  - Assess patient's need for assistive devices and provide as appropriate  - Encourage maximum independence but intervene and supervise when necessary  - Involve family in performance of ADLs  - Assess for home care needs following discharge   - Consider OT consult to assist with ADL evaluation and planning for discharge  - Provide patient education as appropriate  Outcome: Progressing  Goal: Maintains/Returns to pre admission functional level  Description: INTERVENTIONS:  - Perform BMAT or MOVE assessment daily    - Set and communicate daily mobility goal to care team and patient/family/caregiver  - Collaborate with rehabilitation services on mobility goals if consulted  - Perform Range of Motion 3 times a day  - Reposition patient every 2 hours  - Dangle patient 3 times a day  - Stand patient 3 times a day  - Ambulate patient 3 times a day  - Out of bed to chair 3 times a day   - Out of bed for meals 3 times a day  - Out of bed for toileting  - Record patient progress and toleration of activity level   Outcome: Progressing     Problem: DISCHARGE PLANNING  Goal: Discharge to home or other facility with appropriate resources  Description: INTERVENTIONS:  - Identify barriers to discharge w/patient and caregiver  - Arrange for needed discharge resources and transportation as appropriate  - Identify discharge learning needs (meds, wound care, etc )  - Arrange for interpretive services to assist at discharge as needed  - Refer to Case Management Department for coordinating discharge planning if the patient needs post-hospital services based on physician/advanced practitioner order or complex needs related to functional status, cognitive ability, or social support system  Outcome: Progressing     Problem: Knowledge Deficit  Goal: Patient/family/caregiver demonstrates understanding of disease process, treatment plan, medications, and discharge instructions  Description: Complete learning assessment and assess knowledge base    Interventions:  - Provide teaching at level of understanding  - Provide teaching via preferred learning methods  Outcome: Progressing     Problem: MOBILITY - ADULT  Goal: Maintain or return to baseline ADL function  Description: INTERVENTIONS:  -  Assess patient's ability to carry out ADLs; assess patient's baseline for ADL function and identify physical deficits which impact ability to perform ADLs (bathing, care of mouth/teeth, toileting, grooming, dressing, etc )  - Assess/evaluate cause of self-care deficits   - Assess range of motion  - Assess patient's mobility; develop plan if impaired  - Assess patient's need for assistive devices and provide as appropriate  - Encourage maximum independence but intervene and supervise when necessary  - Involve family in performance of ADLs  - Assess for home care needs following discharge   - Consider OT consult to assist with ADL evaluation and planning for discharge  - Provide patient education as appropriate  Outcome: Progressing  Goal: Maintains/Returns to pre admission functional level  Description: INTERVENTIONS:  - Perform BMAT or MOVE assessment daily    - Set and communicate daily mobility goal to care team and patient/family/caregiver  - Collaborate with rehabilitation services on mobility goals if consulted  - Perform Range of Motion 3 times a day  - Reposition patient every 2 hours    - Dangle patient 3 times a day  - Stand patient 3 times a day  - Ambulate patient 3 times a day  - Out of bed to chair 3 times a day   - Out of bed for meals 3 times a day  - Out of bed for toileting  - Record patient progress and toleration of activity level   Outcome: Progressing

## 2022-05-28 NOTE — ASSESSMENT & PLAN NOTE
· Ill-defined bleeding disorder, patient's daughter reported history bleeding after thyroidectomy in the past  · Patient with blood tinged stools per family report    · Now significantly improved

## 2022-05-28 NOTE — DISCHARGE INSTRUCTIONS
Your were admitted for persistent diarrhea  CT scan on admission revealed colitis/proctitis  CT PCR positive but EIA negative  Stool enteric panel negative  Celiac serology pending  You underwent colonoscopy which showed small rectal polyp which was removed, mild erythema in rectosigmoid and internal hemorrhoids  Continue taking vancomycin 125 mg every 6 hours for 8 more days  Follow-up with primary care doctor and Gastroenterology

## 2022-05-28 NOTE — NURSING NOTE
IV removed  Discharge instructions reviewed with pt and daughter  Questions answered  Discharged to home

## 2022-05-28 NOTE — ASSESSMENT & PLAN NOTE
· Around 2 month history of diarrhea, mucousy and blood tinged  · CT on admission revealed colitis/proctitis  · Symptoms reportedly started after patient ate seafood, however other family members ate the same meal and did not get sick  Patient was apparently treated with Cipro and Flagyl without improvement  · C  Diff PCR positive but EIA negative  · Stool enteric panel negative  · Celiac serology pending  · Fecal calprotectin pending  · Underwent colonoscopy which showed small rectal polyp which was removed, mild erythema in rectosigmoid and internal hemorrhoids    · Continue Vancomycin 125 mg Q6hr for total of 10 days  · Diarrhea significantly improved

## 2022-05-28 NOTE — ANESTHESIA POSTPROCEDURE EVALUATION
Post-Op Assessment Note    CV Status:  Stable    Pain management: adequate     Mental Status:  Alert and awake   Hydration Status:  Euvolemic   PONV Controlled:  Controlled   Airway Patency:  Patent      Post Op Vitals Reviewed: Yes            No complications documented      BP      Temp      Pulse     Resp      SpO2      /61   Pulse 74   Temp 98 1 °F (36 7 °C)   Resp 19   Ht 5' 1" (1 549 m)   Wt 50 kg (110 lb 3 7 oz)   SpO2 97%   BMI 20 83 kg/m²

## 2022-05-28 NOTE — PLAN OF CARE
Problem: GASTROINTESTINAL - ADULT  Goal: Minimal or absence of nausea and/or vomiting  Description: INTERVENTIONS:  - Administer IV fluids if ordered to ensure adequate hydration  - Maintain NPO status until nausea and vomiting are resolved  - Nasogastric tube if ordered  - Administer ordered antiemetic medications as needed  - Provide nonpharmacologic comfort measures as appropriate  - Advance diet as tolerated, if ordered  - Consider nutrition services referral to assist patient with adequate nutrition and appropriate food choices  Outcome: Progressing  Goal: Maintains or returns to baseline bowel function  Description: INTERVENTIONS:  - Assess bowel function  - Encourage oral fluids to ensure adequate hydration  - Administer IV fluids if ordered to ensure adequate hydration  - Administer ordered medications as needed  - Encourage mobilization and activity  - Consider nutritional services referral to assist patient with adequate nutrition and appropriate food choices  Outcome: Progressing  Goal: Maintains adequate nutritional intake  Description: INTERVENTIONS:  - Monitor percentage of each meal consumed  - Identify factors contributing to decreased intake, treat as appropriate  - Assist with meals as needed  - Monitor I&O, weight, and lab values if indicated  - Obtain nutrition services referral as needed  Outcome: Progressing     Problem: PAIN - ADULT  Goal: Verbalizes/displays adequate comfort level or baseline comfort level  Description: Interventions:  - Encourage patient to monitor pain and request assistance  - Assess pain using appropriate pain scale  - Administer analgesics based on type and severity of pain and evaluate response  - Implement non-pharmacological measures as appropriate and evaluate response  - Consider cultural and social influences on pain and pain management  - Notify physician/advanced practitioner if interventions unsuccessful or patient reports new pain  Outcome: Progressing Problem: INFECTION - ADULT  Goal: Absence or prevention of progression during hospitalization  Description: INTERVENTIONS:  - Assess and monitor for signs and symptoms of infection  - Monitor lab/diagnostic results  - Monitor all insertion sites, i e  indwelling lines, tubes, and drains  - Monitor endotracheal if appropriate and nasal secretions for changes in amount and color  - [place] appropriate cooling/warming therapies per order  - Administer medications as ordered  - Instruct and encourage patient and family to use good hand hygiene technique  - Identify and instruct in appropriate isolation precautions for identified infection/condition  Outcome: Progressing     Problem: SAFETY ADULT  Goal: Patient will remain free of falls  Description: INTERVENTIONS:  - Educate patient/family on patient safety including physical limitations  - Instruct patient to call for assistance with activity   - Consult OT/PT to assist with strengthening/mobility   - Keep Call bell within reach  - Keep bed low and locked with side rails adjusted as appropriate  - Keep care items and personal belongings within reach  - Initiate and maintain comfort rounds  - Make Fall Risk Sign visible to staff  - Offer Toileting every  Hours, in advance of need  - Initiate/Maintain alarm  - Obtain necessary fall risk management equipment:   - Apply yellow socks and bracelet for high fall risk patients  - Consider moving patient to room near nurses station  Outcome: Progressing  Goal: Maintain or return to baseline ADL function  Description: INTERVENTIONS:  -  Assess patient's ability to carry out ADLs; assess patient's baseline for ADL function and identify physical deficits which impact ability to perform ADLs (bathing, care of mouth/teeth, toileting, grooming, dressing, etc )  - Assess/evaluate cause of self-care deficits   - Assess range of motion  - Assess patient's mobility; develop plan if impaired  - Assess patient's need for assistive devices and provide as appropriate  - Encourage maximum independence but intervene and supervise when necessary  - Involve family in performance of ADLs  - Assess for home care needs following discharge   - Consider OT consult to assist with ADL evaluation and planning for discharge  - Provide patient education as appropriate  Outcome: Progressing  Goal: Maintains/Returns to pre admission functional level  Description: INTERVENTIONS:  - Perform BMAT or MOVE assessment daily    - Set and communicate daily mobility goal to care team and patient/family/caregiver  - Collaborate with rehabilitation services on mobility goals if consulted  - Perform Range of Motion  times a day  - Reposition patient every  hours  - Dangle patient  times a day  - Stand patient  times a day  - Ambulate patient  times a day  - Out of bed to chair  times a day   - Out of bed for meals  times a day  - Out of bed for toileting  - Record patient progress and toleration of activity level   Outcome: Progressing     Problem: DISCHARGE PLANNING  Goal: Discharge to home or other facility with appropriate resources  Description: INTERVENTIONS:  - Identify barriers to discharge w/patient and caregiver  - Arrange for needed discharge resources and transportation as appropriate  - Identify discharge learning needs (meds, wound care, etc )  - Arrange for interpretive services to assist at discharge as needed  - Refer to Case Management Department for coordinating discharge planning if the patient needs post-hospital services based on physician/advanced practitioner order or complex needs related to functional status, cognitive ability, or social support system  Outcome: Progressing     Problem: Knowledge Deficit  Goal: Patient/family/caregiver demonstrates understanding of disease process, treatment plan, medications, and discharge instructions  Description: Complete learning assessment and assess knowledge base    Interventions:  - Provide teaching at level of understanding  - Provide teaching via preferred learning methods  Outcome: Progressing     Problem: MOBILITY - ADULT  Goal: Maintain or return to baseline ADL function  Description: INTERVENTIONS:  -  Assess patient's ability to carry out ADLs; assess patient's baseline for ADL function and identify physical deficits which impact ability to perform ADLs (bathing, care of mouth/teeth, toileting, grooming, dressing, etc )  - Assess/evaluate cause of self-care deficits   - Assess range of motion  - Assess patient's mobility; develop plan if impaired  - Assess patient's need for assistive devices and provide as appropriate  - Encourage maximum independence but intervene and supervise when necessary  - Involve family in performance of ADLs  - Assess for home care needs following discharge   - Consider OT consult to assist with ADL evaluation and planning for discharge  - Provide patient education as appropriate  Outcome: Progressing  Goal: Maintains/Returns to pre admission functional level  Description: INTERVENTIONS:  - Perform BMAT or MOVE assessment daily    - Set and communicate daily mobility goal to care team and patient/family/caregiver  - Collaborate with rehabilitation services on mobility goals if consulted  - Perform Range of Motion  times a day  - Reposition patient every  hours    - Dangle patient  times a day  - Stand patient  times a day  - Ambulate patient  times a day  - Out of bed to chair  times a day   - Out of bed for meals times a day  - Out of bed for toileting  - Record patient progress and toleration of activity level   Outcome: Progressing

## 2022-05-29 LAB — CALPROTECTIN STL-MCNT: 444 UG/G (ref 0–120)

## 2022-05-31 ENCOUNTER — TELEPHONE (OUTPATIENT)
Dept: GASTROENTEROLOGY | Facility: MEDICAL CENTER | Age: 72
End: 2022-05-31

## 2022-05-31 NOTE — TELEPHONE ENCOUNTER
----- Message from Les Reed PA-C sent at 5/27/2022  4:33 PM EDT -----  Please call to schedule hospital f/up of diarrhea in 2-3 weeks

## 2022-06-10 NOTE — RESULT ENCOUNTER NOTE
My medical assistant will call her with her results  Rectal polyp was an adenoma so repeat colonoscopy in 7 years

## 2022-06-13 ENCOUNTER — OFFICE VISIT (OUTPATIENT)
Dept: GASTROENTEROLOGY | Facility: MEDICAL CENTER | Age: 72
End: 2022-06-13
Payer: COMMERCIAL

## 2022-06-13 VITALS
BODY MASS INDEX: 19.07 KG/M2 | HEIGHT: 61 IN | OXYGEN SATURATION: 97 % | WEIGHT: 101 LBS | HEART RATE: 71 BPM | TEMPERATURE: 99.1 F | RESPIRATION RATE: 18 BRPM | DIASTOLIC BLOOD PRESSURE: 70 MMHG | SYSTOLIC BLOOD PRESSURE: 120 MMHG

## 2022-06-13 DIAGNOSIS — A09 DIARRHEA OF INFECTIOUS ORIGIN: Primary | ICD-10-CM

## 2022-06-13 PROCEDURE — 99214 OFFICE O/P EST MOD 30 MIN: CPT | Performed by: INTERNAL MEDICINE

## 2022-06-13 NOTE — PROGRESS NOTES
[facility] - Outpatient Follow-up Note  Jalyn Sebastian 66 y o  female MRN: [id number]  Encounter: [id number]          ASSESSMENT AND PLAN:      1  Diarrhea of infectious origin    Patient likely had food poisoning after eating seafood a month ago, but followed by multiple antibiotics use afterwards  Her admission symptoms more c/w C diff infection  She completed the vancomycin courses and now is symptom free  Her colonoscopy in the hospital was negative  Results and diagnosis were reviewed with patient and daughter in details  Patient's daughter will send her for evaluation if she has more LOC episodes  ______________________________________________________________________    SUBJECTIVE:      79-year-old female with hypothyroidism presented for follow-up after hospital discharge  Patient was admitted at the end of May for diarrhea and abdominal cramps in the past 1 month  She fell sick after eating some seafood and start taking different antibiotics including Cipro, sulfa and metronidazole  She was admitted to the hospital in the end of May and was started on vancomycin empirically  Her stool test showed positive PCR but negative EIA for C diff  She feels much better today, she reported having formed BM daily, no blood in stool  Her daughter reported her mother is visiting from [facility]  She had COVID after she was discharged from hospital but recovered  She also had 2 episodes of transient loss of consciousness  Patient's daughter reported she had these episodes before and was evaluated by doctors in [facility]  REVIEW OF SYSTEMS IS OTHERWISE NEGATIVE        Historical Information   Past Medical History:   Diagnosis Date    Disease of thyroid gland     Diverticulitis     Diverticulitis      Past Surgical History:   Procedure Laterality Date    THYROIDECTOMY      THYROIDECTOMY, PARTIAL       Social History   Social History     Substance and Sexual Activity Alcohol Use Never     Social History     Substance and Sexual Activity   Drug Use Never     Social History     Tobacco Use   Smoking Status Never Smoker   Smokeless Tobacco Never Used     History reviewed  No pertinent family history  Meds/Allergies       Current Outpatient Medications:     escitalopram (LEXAPRO) 10 mg tablet    levothyroxine 50 mcg tablet    Allergies   Allergen Reactions    Povidone Iodine Rash    Secnidazole Facial Swelling    Tinidazole Swelling    Penicillins Rash           Objective     Blood pressure 120/70, pulse 71, temperature 99 1 °F (37 3 °C), temperature source Tympanic, resp  rate 18, height 5' 1" (1 549 m), weight 45 8 kg (101 lb), SpO2 97 %  Body mass index is 19 08 kg/m²  PHYSICAL EXAM:      General Appearance:   Alert, cooperative, no distress   HEENT:   Normocephalic, atraumatic, anicteric      Neck:  Supple, symmetrical, trachea midline   Lungs:   Clear to auscultation bilaterally; no rales, rhonchi or wheezing; respirations unlabored    Heart[de-identified]   Regular rate and rhythm; no murmur, rub, or gallop  Abdomen:   Soft, non-tender, non-distended; normal bowel sounds; no masses, no organomegaly    Genitalia:   Deferred    Rectal:   Deferred    Extremities:  No cyanosis, clubbing or edema    Pulses:  2+ and symmetric    Skin:  No jaundice, rashes, or lesions    Lymph nodes:  No palpable cervical lymphadenopathy        Lab Results:   No visits with results within 1 Day(s) from this visit     Latest known visit with results is:   Admission on 05/25/2022, Discharged on 05/28/2022   Component Date Value    WBC 05/25/2022 9 61     RBC 05/25/2022 4 27     Hemoglobin 05/25/2022 13 3     Hematocrit 05/25/2022 40 3     MCV 05/25/2022 94     MCH 05/25/2022 31 1     MCHC 05/25/2022 33 0     RDW 05/25/2022 13 6     MPV 05/25/2022 8 8 (A)    Platelets 25/64/3861 281     nRBC 05/25/2022 0     Neutrophils Relative 05/25/2022 69     Immat GRANS % 05/25/2022 0     Lymphocytes Relative 05/25/2022 15     Monocytes Relative 05/25/2022 14 (A)    Eosinophils Relative 05/25/2022 2     Basophils Relative 05/25/2022 0     Neutrophils Absolute 05/25/2022 6 57     Immature Grans Absolute 05/25/2022 0 03     Lymphocytes Absolute 05/25/2022 1 46     Monocytes Absolute 05/25/2022 1 31 (A)    Eosinophils Absolute 05/25/2022 0 22     Basophils Absolute 05/25/2022 0 02     Sodium 05/25/2022 132 (A)    Potassium 05/25/2022 4 8     Chloride 05/25/2022 98 (A)    CO2 05/25/2022 27     ANION GAP 05/25/2022 7     BUN 05/25/2022 9     Creatinine 05/25/2022 0 63     Glucose 05/25/2022 100     Calcium 05/25/2022 8 9     eGFR 05/25/2022 89     Salmonella sp PCR 05/25/2022 None Detected     Shigella sp/Enteroinvasi* 05/25/2022 None Detected     Campylobacter sp (jejuni* 05/25/2022 None Detected     Shiga toxin 1/Shiga toxi* 05/25/2022 None Detected      C difficile toxin by PC* 05/25/2022 Positive (A)    C difficile Toxins A+B, * 05/25/2022 Negative     Protime 05/25/2022 13 3     INR 05/25/2022 1 03     PTT 05/25/2022 39 (A)    Calprotectin 05/26/2022 444 (A)    CRP 05/25/2022 89 2 (A)    WBC 05/26/2022 5 50     RBC 05/26/2022 3 93     Hemoglobin 05/26/2022 11 7     Hematocrit 05/26/2022 36 3     MCV 05/26/2022 92     MCH 05/26/2022 29 8     MCHC 05/26/2022 32 2     RDW 05/26/2022 13 5     MPV 05/26/2022 9 2     Platelets 73/18/0074 278     nRBC 05/26/2022 0     Neutrophils Relative 05/26/2022 58     Immat GRANS % 05/26/2022 1     Lymphocytes Relative 05/26/2022 24     Monocytes Relative 05/26/2022 13 (A)    Eosinophils Relative 05/26/2022 4     Basophils Relative 05/26/2022 0     Neutrophils Absolute 05/26/2022 3 16     Immature Grans Absolute 05/26/2022 0 04     Lymphocytes Absolute 05/26/2022 1 33     Monocytes Absolute 05/26/2022 0 71     Eosinophils Absolute 05/26/2022 0 24     Basophils Absolute 05/26/2022 0 02     Sodium 05/26/2022 135 (A)  Potassium 05/26/2022 4 7     Chloride 05/26/2022 104     CO2 05/26/2022 25     ANION GAP 05/26/2022 6     BUN 05/26/2022 4 (A)    Creatinine 05/26/2022 0 67     Glucose 05/26/2022 99     Calcium 05/26/2022 8 8     eGFR 05/26/2022 88     Sodium 05/27/2022 138     Potassium 05/27/2022 4 2     Chloride 05/27/2022 108     CO2 05/27/2022 23     ANION GAP 05/27/2022 7     BUN 05/27/2022 2 (A)    Creatinine 05/27/2022 0 53 (A)    Glucose 05/27/2022 98     Calcium 05/27/2022 8 9     eGFR 05/27/2022 95     WBC 05/27/2022 5 09     RBC 05/27/2022 3 90     Hemoglobin 05/27/2022 11 7     Hematocrit 05/27/2022 36 3     MCV 05/27/2022 93     MCH 05/27/2022 30 0     MCHC 05/27/2022 32 2     RDW 05/27/2022 13 5     Platelets 94/04/8533 274     MPV 05/27/2022 8 7 (A)    IGA 05/27/2022 303 0     TISSUE TRANSGLUTAMINASE * 05/27/2022 <2     Case Report 05/27/2022                      Value:Surgical Pathology Report                         Case: [id number]                                   Authorizing Provider:  Celeste Salazar MD           Collected:           05/27/2022 1536              Ordering Location:     [facility]        Received:            05/27/2022 [address] [facility] [address]                                                             Pathologist:           Rikki Juares MD                                                       Specimens:   A) - Colon, r/o colitis                                                                             B) - Rectum, poylp                                                                                  C) - Large Intestine, Sigmoid Colon, rectosigmoid, colitis                                 Final Diagnosis 05/27/2022                      Value: This result contains rich text formatting which cannot be displayed here   Note 05/27/2022                      Value: This result contains rich text formatting which cannot be displayed here   Additional Information 05/27/2022                      Value: This result contains rich text formatting which cannot be displayed here   Synoptic Checklist 05/27/2022                      Value:                            COLON/RECTUM POLYP FORM - GI - B                                                                                     :    Adenoma(s)      Gross Description 05/27/2022                      Value: This result contains rich text formatting which cannot be displayed here   WBC 05/28/2022 4 77     RBC 05/28/2022 3 74 (A)    Hemoglobin 05/28/2022 11 6     Hematocrit 05/28/2022 35 4     MCV 05/28/2022 95     MCH 05/28/2022 31 0     MCHC 05/28/2022 32 8     RDW 05/28/2022 13 6     MPV 05/28/2022 8 9     Platelets 04/16/0323 286     Sodium 05/28/2022 139     Potassium 05/28/2022 4 3     Chloride 05/28/2022 107     CO2 05/28/2022 27     ANION GAP 05/28/2022 5     BUN 05/28/2022 5     Creatinine 05/28/2022 0 65     Glucose 05/28/2022 107     Calcium 05/28/2022 8 8     eGFR 05/28/2022 88     Magnesium 05/28/2022 1 9     Segmented % 05/28/2022 66     Lymphocytes % 05/28/2022 23     Monocytes % 05/28/2022 8     Eosinophils, % 05/28/2022 3     Basophils % 05/28/2022 0     Absolute Neutrophils 05/28/2022 3 15     Lymphocytes Absolute 05/28/2022 1 10     Monocytes Absolute 05/28/2022 0 38     Eosinophils Absolute 05/28/2022 0 14     Basophils Absolute 05/28/2022 0 00     RBC Morphology 05/28/2022 Normal     Platelet Estimate 93/89/4304 Adequate     Large Platelet 19/14/4166 Present          Radiology Results:   Colonoscopy    Result Date: 5/27/2022  Narrative: [facility] Endoscopy [facility] [address] [phone] DATE OF SERVICE: 5/27/22 PHYSICIAN(S): Attending: Etienne Stephen MD Fellow: No Staff Documented INDICATION: Colitis POST-OP DIAGNOSIS: See the impression below  HISTORY: Prior colonoscopy: 10 years ago  BOWEL PREPARATION: Miralax/Dulcolax PREPROCEDURE: Informed consent was obtained for the procedure, including sedation  Risks including but not limited to bleeding, infection, perforation, adverse drug reaction and aspiration were explained in detail  Also explained about less than 100% sensitivity with the exam and other alternatives  The patient was placed in the left lateral decubitus position  DETAILS OF PROCEDURE: Patient was taken to the procedure room where a time out was performed to confirm correct patient and correct procedure  The patient underwent monitored anesthesia care, which was administered by an anesthesia professional  The patient's blood pressure, heart rate, level of consciousness, oxygen and respirations were monitored throughout the procedure  A digital rectal exam was performed  The scope was introduced through the anus and advanced to the terminal ileum  Retroflexion was performed in the rectum  The quality of bowel preparation was evaluated using the Michael Bowel Preparation Scale with scores of: right colon = 2, transverse colon = 2, left colon = 2  The total BBPS score was 6  Bowel prep was adequate  The patient experienced no blood loss  The procedure was not difficult  The patient tolerated the procedure well  There were no apparent complications  ANESTHESIA INFORMATION: ASA: II Anesthesia Type: Anesthesia type not filed in the log   MEDICATIONS: simethicone (MYLICON) oral suspension 40 mg (Totals for administrations occurring from 1514 to 1554 on 05/27/22) FINDINGS: 6 mm sessile polyp in the rectum; removed by cold snare Mild erythematous mucosa in the rectosigmoid; performed 6 cold forceps biopsies The terminal ileum appeared normal  Performed multiple forceps biopsies in the ascending colon, transverse colon and descending colon Internal hemorrhoids EVENTS: Procedure Events Event Event Time ENDO CECUM REACHED 5/27/2022  3:31 PM ENDO SCOPE OUT TIME 5/27/2022  3:45 PM SPECIMENS: ID Type Source Tests Collected by Time Destination 1 : r/o colitis Tissue Colon TISSUE EXAM Luz Lawson MD 5/27/2022  3:36 PM  2 : poylp Tissue Rectum TISSUE EXAM Luz Lawson MD 5/27/2022  3:44 PM  3 : rectosigmoid, colitis Tissue Large Intestine, Sigmoid Colon TISSUE EXAM Luz Lawson MD 5/27/2022  3:44 PM  EQUIPMENT: Colonoscope -     Impression: Small rectal polyp removed Mild erythema in rectosigmoid Internal hemorrhoids RECOMMENDATION: Repeat colonoscopy in 7 years due to a personal history of colon polyps Return to floor and resume diet  Luz Lawson MD     CT abdomen pelvis wo contrast    Result Date: 5/25/2022  Narrative: CT ABDOMEN AND PELVIS WITHOUT IV CONTRAST INDICATION:   Lower abdominal pain and diarrhea    COMPARISON:  None  TECHNIQUE:  CT examination of the abdomen and pelvis was performed without intravenous contrast  This examination was performed without intravenous contrast in the context of the critical nationwide Omnipaque shortage  Axial, sagittal, and coronal 2D reformatted images were created from the source data and submitted for interpretation  Radiation dose length product (DLP) for this visit:  401 mGy-cm   This examination, like all CT scans performed in the [facility], was performed utilizing techniques to minimize radiation dose exposure, including the use of iterative reconstruction and automated exposure control  Enteric contrast was administered  FINDINGS: ABDOMEN LOWER CHEST:  No clinically significant abnormality identified in the visualized lower chest  LIVER/BILIARY TREE:  Unremarkable  GALLBLADDER:  No calcified gallstones  No pericholecystic inflammatory change  SPLEEN:  Unremarkable  PANCREAS:  Unremarkable  ADRENAL GLANDS:  Unremarkable  KIDNEYS/URETERS:  Unremarkable  No hydronephrosis  STOMACH AND BOWEL:  Diffuse colonic wall thickening especially in the left side with mild pericolonic inflammatory change as well as perirectal fat infiltration    Findings are concerning for uncomplicated colitis/proctitis  No collection identified in this unenhanced study  APPENDIX:  A normal appendix was visualized  ABDOMINOPELVIC CAVITY:  No ascites  No pneumoperitoneum  No lymphadenopathy  VESSELS:  Unremarkable for patient's age  PELVIS REPRODUCTIVE ORGANS:  Surgical changes of prior hysterectomy  URINARY BLADDER:  Unremarkable  ABDOMINAL WALL/INGUINAL REGIONS:  Unremarkable  OSSEOUS STRUCTURES:  No acute fracture or destructive osseous lesion  Impression: Diffuse colonic and rectal wall thickening consistent with uncomplicated colitis/proctitis   Workstation performed: [id number]